# Patient Record
Sex: FEMALE | Race: WHITE | NOT HISPANIC OR LATINO | URBAN - METROPOLITAN AREA
[De-identification: names, ages, dates, MRNs, and addresses within clinical notes are randomized per-mention and may not be internally consistent; named-entity substitution may affect disease eponyms.]

---

## 2020-01-01 ENCOUNTER — HOSPITAL ENCOUNTER (INPATIENT)
Facility: HOSPITAL | Age: 85
LOS: 5 days | DRG: 177 | End: 2020-04-21
Attending: EMERGENCY MEDICINE | Admitting: FAMILY MEDICINE
Payer: MEDICARE

## 2020-01-01 VITALS
HEART RATE: 69 BPM | HEIGHT: 62 IN | SYSTOLIC BLOOD PRESSURE: 102 MMHG | DIASTOLIC BLOOD PRESSURE: 52 MMHG | WEIGHT: 118 LBS | RESPIRATION RATE: 20 BRPM | BODY MASS INDEX: 21.71 KG/M2 | OXYGEN SATURATION: 96 % | TEMPERATURE: 99 F

## 2020-01-01 DIAGNOSIS — D64.9 ANEMIA, UNSPECIFIED TYPE: ICD-10-CM

## 2020-01-01 DIAGNOSIS — F03.90 DEMENTIA WITHOUT BEHAVIORAL DISTURBANCE, UNSPECIFIED DEMENTIA TYPE: ICD-10-CM

## 2020-01-01 DIAGNOSIS — Z20.822 SUSPECTED COVID-19 VIRUS INFECTION: ICD-10-CM

## 2020-01-01 DIAGNOSIS — Z51.5 PALLIATIVE CARE ENCOUNTER: ICD-10-CM

## 2020-01-01 DIAGNOSIS — U07.1 COVID-19 VIRUS INFECTION: Primary | ICD-10-CM

## 2020-01-01 DIAGNOSIS — R06.03 ACUTE RESPIRATORY DISTRESS: ICD-10-CM

## 2020-01-01 LAB
ABO + RH BLD: NORMAL
ALBUMIN SERPL BCP-MCNC: 2.2 G/DL (ref 3.5–5.2)
ALBUMIN SERPL BCP-MCNC: 2.3 G/DL (ref 3.5–5.2)
ALBUMIN SERPL BCP-MCNC: 2.6 G/DL (ref 3.5–5.2)
ALBUMIN SERPL BCP-MCNC: 2.6 G/DL (ref 3.5–5.2)
ALP SERPL-CCNC: 108 U/L (ref 55–135)
ALP SERPL-CCNC: 109 U/L (ref 55–135)
ALP SERPL-CCNC: 112 U/L (ref 55–135)
ALP SERPL-CCNC: 97 U/L (ref 55–135)
ALT SERPL W/O P-5'-P-CCNC: 13 U/L (ref 10–44)
ALT SERPL W/O P-5'-P-CCNC: 15 U/L (ref 10–44)
ALT SERPL W/O P-5'-P-CCNC: 16 U/L (ref 10–44)
ALT SERPL W/O P-5'-P-CCNC: 16 U/L (ref 10–44)
ANION GAP SERPL CALC-SCNC: 10 MMOL/L (ref 8–16)
ANION GAP SERPL CALC-SCNC: 11 MMOL/L (ref 8–16)
AST SERPL-CCNC: 27 U/L (ref 10–40)
AST SERPL-CCNC: 29 U/L (ref 10–40)
AST SERPL-CCNC: 29 U/L (ref 10–40)
AST SERPL-CCNC: 30 U/L (ref 10–40)
BACTERIA BLD CULT: NORMAL
BACTERIA BLD CULT: NORMAL
BASOPHILS # BLD AUTO: 0.01 K/UL (ref 0–0.2)
BASOPHILS NFR BLD: 0.1 % (ref 0–1.9)
BILIRUB SERPL-MCNC: 0.4 MG/DL (ref 0.1–1)
BILIRUB SERPL-MCNC: 0.4 MG/DL (ref 0.1–1)
BILIRUB SERPL-MCNC: 0.5 MG/DL (ref 0.1–1)
BILIRUB SERPL-MCNC: 1.1 MG/DL (ref 0.1–1)
BLD GP AB SCN CELLS X3 SERPL QL: NORMAL
BLD PROD TYP BPU: NORMAL
BLOOD UNIT EXPIRATION DATE: NORMAL
BLOOD UNIT TYPE CODE: 7300
BLOOD UNIT TYPE: NORMAL
BNP SERPL-MCNC: 2211 PG/ML (ref 0–99)
BNP SERPL-MCNC: 3767 PG/ML (ref 0–99)
BUN SERPL-MCNC: 26 MG/DL (ref 10–30)
BUN SERPL-MCNC: 30 MG/DL (ref 10–30)
BUN SERPL-MCNC: 34 MG/DL (ref 10–30)
BUN SERPL-MCNC: 42 MG/DL (ref 10–30)
CALCIUM SERPL-MCNC: 7.8 MG/DL (ref 8.7–10.5)
CALCIUM SERPL-MCNC: 7.9 MG/DL (ref 8.7–10.5)
CALCIUM SERPL-MCNC: 7.9 MG/DL (ref 8.7–10.5)
CALCIUM SERPL-MCNC: 8.1 MG/DL (ref 8.7–10.5)
CHLORIDE SERPL-SCNC: 104 MMOL/L (ref 95–110)
CHLORIDE SERPL-SCNC: 106 MMOL/L (ref 95–110)
CHLORIDE SERPL-SCNC: 112 MMOL/L (ref 95–110)
CHLORIDE SERPL-SCNC: 112 MMOL/L (ref 95–110)
CK SERPL-CCNC: 32 U/L (ref 20–180)
CK SERPL-CCNC: 40 U/L (ref 20–180)
CO2 SERPL-SCNC: 20 MMOL/L (ref 23–29)
CO2 SERPL-SCNC: 21 MMOL/L (ref 23–29)
CO2 SERPL-SCNC: 24 MMOL/L (ref 23–29)
CO2 SERPL-SCNC: 24 MMOL/L (ref 23–29)
CODING SYSTEM: NORMAL
CREAT SERPL-MCNC: 0.8 MG/DL (ref 0.5–1.4)
CREAT SERPL-MCNC: 0.8 MG/DL (ref 0.5–1.4)
CREAT SERPL-MCNC: 1.1 MG/DL (ref 0.5–1.4)
CREAT SERPL-MCNC: 1.1 MG/DL (ref 0.5–1.4)
CRP SERPL-MCNC: 24.2 MG/L (ref 0–8.2)
CRP SERPL-MCNC: 27.6 MG/L (ref 0–8.2)
CRP SERPL-MCNC: 74.5 MG/L (ref 0–8.2)
CTP QC/QA: YES
DIFFERENTIAL METHOD: ABNORMAL
DISPENSE STATUS: NORMAL
EOSINOPHIL # BLD AUTO: 0 K/UL (ref 0–0.5)
EOSINOPHIL NFR BLD: 0 % (ref 0–8)
ERYTHROCYTE [DISTWIDTH] IN BLOOD BY AUTOMATED COUNT: 18.6 % (ref 11.5–14.5)
ERYTHROCYTE [DISTWIDTH] IN BLOOD BY AUTOMATED COUNT: 18.6 % (ref 11.5–14.5)
ERYTHROCYTE [DISTWIDTH] IN BLOOD BY AUTOMATED COUNT: 19 % (ref 11.5–14.5)
ERYTHROCYTE [DISTWIDTH] IN BLOOD BY AUTOMATED COUNT: 20.4 % (ref 11.5–14.5)
ERYTHROCYTE [SEDIMENTATION RATE] IN BLOOD BY WESTERGREN METHOD: 12 MM/HR (ref 0–20)
EST. GFR  (AFRICAN AMERICAN): 50 ML/MIN/1.73 M^2
EST. GFR  (AFRICAN AMERICAN): 50 ML/MIN/1.73 M^2
EST. GFR  (AFRICAN AMERICAN): >60 ML/MIN/1.73 M^2
EST. GFR  (AFRICAN AMERICAN): >60 ML/MIN/1.73 M^2
EST. GFR  (NON AFRICAN AMERICAN): 43 ML/MIN/1.73 M^2
EST. GFR  (NON AFRICAN AMERICAN): 43 ML/MIN/1.73 M^2
EST. GFR  (NON AFRICAN AMERICAN): >60 ML/MIN/1.73 M^2
EST. GFR  (NON AFRICAN AMERICAN): >60 ML/MIN/1.73 M^2
FERRITIN SERPL-MCNC: 112 NG/ML (ref 20–300)
FOLATE SERPL-MCNC: 19.5 NG/ML (ref 4–24)
GLUCOSE SERPL-MCNC: 106 MG/DL (ref 70–110)
GLUCOSE SERPL-MCNC: 119 MG/DL (ref 70–110)
GLUCOSE SERPL-MCNC: 67 MG/DL (ref 70–110)
GLUCOSE SERPL-MCNC: 98 MG/DL (ref 70–110)
HCT VFR BLD AUTO: 23.2 % (ref 37–48.5)
HCT VFR BLD AUTO: 30.7 % (ref 37–48.5)
HCT VFR BLD AUTO: 30.9 % (ref 37–48.5)
HCT VFR BLD AUTO: 35.5 % (ref 37–48.5)
HGB BLD-MCNC: 6.5 G/DL (ref 12–16)
HGB BLD-MCNC: 8.8 G/DL (ref 12–16)
HGB BLD-MCNC: 9 G/DL (ref 12–16)
HGB BLD-MCNC: 9.8 G/DL (ref 12–16)
IMM GRANULOCYTES # BLD AUTO: 0.04 K/UL (ref 0–0.04)
IMM GRANULOCYTES # BLD AUTO: 0.05 K/UL (ref 0–0.04)
IMM GRANULOCYTES # BLD AUTO: 0.09 K/UL (ref 0–0.04)
IMM GRANULOCYTES # BLD AUTO: 0.09 K/UL (ref 0–0.04)
IMM GRANULOCYTES NFR BLD AUTO: 0.5 % (ref 0–0.5)
IMM GRANULOCYTES NFR BLD AUTO: 0.6 % (ref 0–0.5)
IMM GRANULOCYTES NFR BLD AUTO: 0.6 % (ref 0–0.5)
IMM GRANULOCYTES NFR BLD AUTO: 0.8 % (ref 0–0.5)
IRON SERPL-MCNC: 76 UG/DL (ref 30–160)
LACTATE SERPL-SCNC: 1.6 MMOL/L (ref 0.5–2.2)
LACTATE SERPL-SCNC: 2.6 MMOL/L (ref 0.5–2.2)
LDH SERPL L TO P-CCNC: 288 U/L (ref 110–260)
LDH SERPL L TO P-CCNC: 395 U/L (ref 110–260)
LYMPHOCYTES # BLD AUTO: 0.6 K/UL (ref 1–4.8)
LYMPHOCYTES # BLD AUTO: 0.6 K/UL (ref 1–4.8)
LYMPHOCYTES # BLD AUTO: 0.7 K/UL (ref 1–4.8)
LYMPHOCYTES # BLD AUTO: 0.7 K/UL (ref 1–4.8)
LYMPHOCYTES NFR BLD: 5.2 % (ref 18–48)
LYMPHOCYTES NFR BLD: 5.6 % (ref 18–48)
LYMPHOCYTES NFR BLD: 6.6 % (ref 18–48)
LYMPHOCYTES NFR BLD: 9.8 % (ref 18–48)
MAGNESIUM SERPL-MCNC: 2.3 MG/DL (ref 1.6–2.6)
MAGNESIUM SERPL-MCNC: 2.5 MG/DL (ref 1.6–2.6)
MCH RBC QN AUTO: 26 PG (ref 27–31)
MCH RBC QN AUTO: 26.1 PG (ref 27–31)
MCH RBC QN AUTO: 26.4 PG (ref 27–31)
MCH RBC QN AUTO: 26.9 PG (ref 27–31)
MCHC RBC AUTO-ENTMCNC: 27.6 G/DL (ref 32–36)
MCHC RBC AUTO-ENTMCNC: 28 G/DL (ref 32–36)
MCHC RBC AUTO-ENTMCNC: 28.7 G/DL (ref 32–36)
MCHC RBC AUTO-ENTMCNC: 29.1 G/DL (ref 32–36)
MCV RBC AUTO: 91 FL (ref 82–98)
MCV RBC AUTO: 91 FL (ref 82–98)
MCV RBC AUTO: 93 FL (ref 82–98)
MCV RBC AUTO: 98 FL (ref 82–98)
MONOCYTES # BLD AUTO: 0.6 K/UL (ref 0.3–1)
MONOCYTES # BLD AUTO: 0.7 K/UL (ref 0.3–1)
MONOCYTES # BLD AUTO: 0.8 K/UL (ref 0.3–1)
MONOCYTES # BLD AUTO: 1 K/UL (ref 0.3–1)
MONOCYTES NFR BLD: 7.2 % (ref 4–15)
MONOCYTES NFR BLD: 7.6 % (ref 4–15)
MONOCYTES NFR BLD: 7.6 % (ref 4–15)
MONOCYTES NFR BLD: 9.2 % (ref 4–15)
NEUTROPHILS # BLD AUTO: 12.1 K/UL (ref 1.8–7.7)
NEUTROPHILS # BLD AUTO: 5.5 K/UL (ref 1.8–7.7)
NEUTROPHILS # BLD AUTO: 7.9 K/UL (ref 1.8–7.7)
NEUTROPHILS # BLD AUTO: 9.6 K/UL (ref 1.8–7.7)
NEUTROPHILS NFR BLD: 80.3 % (ref 38–73)
NEUTROPHILS NFR BLD: 85.2 % (ref 38–73)
NEUTROPHILS NFR BLD: 85.9 % (ref 38–73)
NEUTROPHILS NFR BLD: 86.9 % (ref 38–73)
NRBC BLD-RTO: 0 /100 WBC
NRBC BLD-RTO: 1 /100 WBC
PHOSPHATE SERPL-MCNC: 2.8 MG/DL (ref 2.7–4.5)
PHOSPHATE SERPL-MCNC: 4.1 MG/DL (ref 2.7–4.5)
PLATELET # BLD AUTO: 398 K/UL (ref 150–350)
PLATELET # BLD AUTO: 401 K/UL (ref 150–350)
PLATELET # BLD AUTO: 420 K/UL (ref 150–350)
PLATELET # BLD AUTO: 509 K/UL (ref 150–350)
PMV BLD AUTO: 9.3 FL (ref 9.2–12.9)
PMV BLD AUTO: 9.3 FL (ref 9.2–12.9)
PMV BLD AUTO: 9.4 FL (ref 9.2–12.9)
PMV BLD AUTO: 9.5 FL (ref 9.2–12.9)
POC MOLECULAR INFLUENZA A AGN: NEGATIVE
POC MOLECULAR INFLUENZA B AGN: NEGATIVE
POCT GLUCOSE: 93 MG/DL (ref 70–110)
POTASSIUM SERPL-SCNC: 4.5 MMOL/L (ref 3.5–5.1)
POTASSIUM SERPL-SCNC: 4.9 MMOL/L (ref 3.5–5.1)
POTASSIUM SERPL-SCNC: 4.9 MMOL/L (ref 3.5–5.1)
POTASSIUM SERPL-SCNC: 5 MMOL/L (ref 3.5–5.1)
PROCALCITONIN SERPL IA-MCNC: 0.05 NG/ML
PROT SERPL-MCNC: 5.4 G/DL (ref 6–8.4)
PROT SERPL-MCNC: 5.6 G/DL (ref 6–8.4)
PROT SERPL-MCNC: 5.7 G/DL (ref 6–8.4)
PROT SERPL-MCNC: 5.8 G/DL (ref 6–8.4)
RBC # BLD AUTO: 2.49 M/UL (ref 4–5.4)
RBC # BLD AUTO: 3.39 M/UL (ref 4–5.4)
RBC # BLD AUTO: 3.41 M/UL (ref 4–5.4)
RBC # BLD AUTO: 3.64 M/UL (ref 4–5.4)
SARS-COV-2 RDRP RESP QL NAA+PROBE: POSITIVE
SATURATED IRON: 23 % (ref 20–50)
SODIUM SERPL-SCNC: 138 MMOL/L (ref 136–145)
SODIUM SERPL-SCNC: 140 MMOL/L (ref 136–145)
SODIUM SERPL-SCNC: 143 MMOL/L (ref 136–145)
SODIUM SERPL-SCNC: 143 MMOL/L (ref 136–145)
TOTAL IRON BINDING CAPACITY: 332 UG/DL (ref 250–450)
TRANS ERYTHROCYTES VOL PATIENT: NORMAL ML
TRANSFERRIN SERPL-MCNC: 224 MG/DL (ref 200–375)
TROPONIN I SERPL DL<=0.01 NG/ML-MCNC: 0.12 NG/ML (ref 0–0.03)
TROPONIN I SERPL DL<=0.01 NG/ML-MCNC: 0.13 NG/ML (ref 0–0.03)
VIT B12 SERPL-MCNC: 401 PG/ML (ref 210–950)
WBC # BLD AUTO: 11.12 K/UL (ref 3.9–12.7)
WBC # BLD AUTO: 13.87 K/UL (ref 3.9–12.7)
WBC # BLD AUTO: 6.83 K/UL (ref 3.9–12.7)
WBC # BLD AUTO: 9.31 K/UL (ref 3.9–12.7)

## 2020-01-01 PROCEDURE — 99231 SBSQ HOSP IP/OBS SF/LOW 25: CPT | Mod: ,,, | Performed by: NURSE PRACTITIONER

## 2020-01-01 PROCEDURE — 86140 C-REACTIVE PROTEIN: CPT | Mod: 91

## 2020-01-01 PROCEDURE — 86850 RBC ANTIBODY SCREEN: CPT

## 2020-01-01 PROCEDURE — 84484 ASSAY OF TROPONIN QUANT: CPT

## 2020-01-01 PROCEDURE — 25000003 PHARM REV CODE 250: Performed by: INTERNAL MEDICINE

## 2020-01-01 PROCEDURE — 94761 N-INVAS EAR/PLS OXIMETRY MLT: CPT

## 2020-01-01 PROCEDURE — 85652 RBC SED RATE AUTOMATED: CPT

## 2020-01-01 PROCEDURE — 27100171 HC OXYGEN HIGH FLOW UP TO 24 HOURS

## 2020-01-01 PROCEDURE — 83605 ASSAY OF LACTIC ACID: CPT

## 2020-01-01 PROCEDURE — 63600175 PHARM REV CODE 636 W HCPCS: Performed by: INTERNAL MEDICINE

## 2020-01-01 PROCEDURE — 80053 COMPREHEN METABOLIC PANEL: CPT

## 2020-01-01 PROCEDURE — 85025 COMPLETE CBC W/AUTO DIFF WBC: CPT

## 2020-01-01 PROCEDURE — U0002 COVID-19 LAB TEST NON-CDC: HCPCS

## 2020-01-01 PROCEDURE — 86920 COMPATIBILITY TEST SPIN: CPT

## 2020-01-01 PROCEDURE — 83880 ASSAY OF NATRIURETIC PEPTIDE: CPT

## 2020-01-01 PROCEDURE — 25000003 PHARM REV CODE 250: Performed by: FAMILY MEDICINE

## 2020-01-01 PROCEDURE — P9021 RED BLOOD CELLS UNIT: HCPCS

## 2020-01-01 PROCEDURE — 82550 ASSAY OF CK (CPK): CPT

## 2020-01-01 PROCEDURE — 11000001 HC ACUTE MED/SURG PRIVATE ROOM

## 2020-01-01 PROCEDURE — 36415 COLL VENOUS BLD VENIPUNCTURE: CPT

## 2020-01-01 PROCEDURE — 99223 1ST HOSP IP/OBS HIGH 75: CPT | Mod: ,,, | Performed by: NURSE PRACTITIONER

## 2020-01-01 PROCEDURE — 82728 ASSAY OF FERRITIN: CPT

## 2020-01-01 PROCEDURE — 83735 ASSAY OF MAGNESIUM: CPT

## 2020-01-01 PROCEDURE — 99223 PR INITIAL HOSPITAL CARE,LEVL III: ICD-10-PCS | Mod: ,,, | Performed by: NURSE PRACTITIONER

## 2020-01-01 PROCEDURE — 82607 VITAMIN B-12: CPT

## 2020-01-01 PROCEDURE — 63700000 PHARM REV CODE 250 ALT 637 W/O HCPCS: Performed by: INTERNAL MEDICINE

## 2020-01-01 PROCEDURE — 83540 ASSAY OF IRON: CPT

## 2020-01-01 PROCEDURE — 99291 CRITICAL CARE FIRST HOUR: CPT | Mod: 25

## 2020-01-01 PROCEDURE — 83615 LACTATE (LD) (LDH) ENZYME: CPT | Mod: 91

## 2020-01-01 PROCEDURE — 99231 PR SUBSEQUENT HOSPITAL CARE,LEVL I: ICD-10-PCS | Mod: ,,, | Performed by: NURSE PRACTITIONER

## 2020-01-01 PROCEDURE — 84100 ASSAY OF PHOSPHORUS: CPT

## 2020-01-01 PROCEDURE — 87502 INFLUENZA DNA AMP PROBE: CPT

## 2020-01-01 PROCEDURE — 80053 COMPREHEN METABOLIC PANEL: CPT | Mod: 91

## 2020-01-01 PROCEDURE — 36430 TRANSFUSION BLD/BLD COMPNT: CPT

## 2020-01-01 PROCEDURE — 82746 ASSAY OF FOLIC ACID SERUM: CPT

## 2020-01-01 PROCEDURE — 87040 BLOOD CULTURE FOR BACTERIA: CPT

## 2020-01-01 PROCEDURE — 86140 C-REACTIVE PROTEIN: CPT

## 2020-01-01 PROCEDURE — 84145 PROCALCITONIN (PCT): CPT

## 2020-01-01 RX ORDER — CLONAZEPAM 0.25 MG/1
0.25 TABLET, ORALLY DISINTEGRATING ORAL 2 TIMES DAILY
Status: DISCONTINUED | OUTPATIENT
Start: 2020-01-01 | End: 2020-01-01

## 2020-01-01 RX ORDER — HYDROCODONE BITARTRATE AND ACETAMINOPHEN 500; 5 MG/1; MG/1
TABLET ORAL
Status: DISCONTINUED | OUTPATIENT
Start: 2020-01-01 | End: 2020-01-01 | Stop reason: HOSPADM

## 2020-01-01 RX ORDER — ATORVASTATIN CALCIUM 40 MG/1
40 TABLET, FILM COATED ORAL DAILY
Status: DISCONTINUED | OUTPATIENT
Start: 2020-01-01 | End: 2020-01-01 | Stop reason: HOSPADM

## 2020-01-01 RX ORDER — CETIRIZINE HYDROCHLORIDE 10 MG/1
10 TABLET ORAL DAILY
COMMUNITY
End: 2020-04-28 | Stop reason: HOSPADM

## 2020-01-01 RX ORDER — DONEPEZIL HYDROCHLORIDE 10 MG/1
10 TABLET, FILM COATED ORAL NIGHTLY
COMMUNITY
End: 2020-04-28 | Stop reason: HOSPADM

## 2020-01-01 RX ORDER — AZITHROMYCIN 250 MG/1
250 TABLET, FILM COATED ORAL DAILY
Status: DISCONTINUED | OUTPATIENT
Start: 2020-01-01 | End: 2020-01-01 | Stop reason: HOSPADM

## 2020-01-01 RX ORDER — NAPROXEN SODIUM 220 MG/1
81 TABLET, FILM COATED ORAL 2 TIMES DAILY
COMMUNITY
End: 2020-04-28 | Stop reason: HOSPADM

## 2020-01-01 RX ORDER — DICLOFENAC SODIUM 10 MG/G
2 GEL TOPICAL DAILY
COMMUNITY
End: 2020-04-28 | Stop reason: HOSPADM

## 2020-01-01 RX ORDER — CHOLECALCIFEROL (VITAMIN D3) 125 MCG
440 CAPSULE ORAL
COMMUNITY
End: 2020-04-28 | Stop reason: HOSPADM

## 2020-01-01 RX ORDER — ONDANSETRON 2 MG/ML
4 INJECTION INTRAMUSCULAR; INTRAVENOUS EVERY 6 HOURS PRN
Status: DISCONTINUED | OUTPATIENT
Start: 2020-01-01 | End: 2020-01-01 | Stop reason: HOSPADM

## 2020-01-01 RX ORDER — ALBUTEROL SULFATE 90 UG/1
2 AEROSOL, METERED RESPIRATORY (INHALATION) EVERY 6 HOURS PRN
Status: DISCONTINUED | OUTPATIENT
Start: 2020-01-01 | End: 2020-01-01 | Stop reason: HOSPADM

## 2020-01-01 RX ORDER — NAPROXEN SODIUM 220 MG/1
81 TABLET, FILM COATED ORAL 2 TIMES DAILY
Status: DISCONTINUED | OUTPATIENT
Start: 2020-01-01 | End: 2020-01-01 | Stop reason: HOSPADM

## 2020-01-01 RX ORDER — ACETAMINOPHEN 325 MG/1
650 TABLET ORAL EVERY 6 HOURS PRN
Status: DISCONTINUED | OUTPATIENT
Start: 2020-01-01 | End: 2020-01-01 | Stop reason: HOSPADM

## 2020-01-01 RX ORDER — METOPROLOL TARTRATE 1 MG/ML
5 INJECTION, SOLUTION INTRAVENOUS
Status: DISCONTINUED | OUTPATIENT
Start: 2020-01-01 | End: 2020-01-01 | Stop reason: HOSPADM

## 2020-01-01 RX ORDER — FERROUS SULFATE 324(65)MG
325 TABLET, DELAYED RELEASE (ENTERIC COATED) ORAL DAILY
COMMUNITY
End: 2020-04-28 | Stop reason: HOSPADM

## 2020-01-01 RX ORDER — CALCIUM CARBONATE 200(500)MG
1000 TABLET,CHEWABLE ORAL 2 TIMES DAILY PRN
Status: DISCONTINUED | OUTPATIENT
Start: 2020-01-01 | End: 2020-01-01 | Stop reason: HOSPADM

## 2020-01-01 RX ORDER — TALC
3 POWDER (GRAM) TOPICAL NIGHTLY PRN
Status: DISCONTINUED | OUTPATIENT
Start: 2020-01-01 | End: 2020-01-01 | Stop reason: HOSPADM

## 2020-01-01 RX ORDER — ENOXAPARIN SODIUM 100 MG/ML
40 INJECTION SUBCUTANEOUS EVERY 24 HOURS
Status: DISCONTINUED | OUTPATIENT
Start: 2020-01-01 | End: 2020-01-01 | Stop reason: HOSPADM

## 2020-01-01 RX ORDER — DONEPEZIL HYDROCHLORIDE 10 MG/1
10 TABLET, FILM COATED ORAL NIGHTLY
Status: DISCONTINUED | OUTPATIENT
Start: 2020-01-01 | End: 2020-01-01 | Stop reason: HOSPADM

## 2020-01-01 RX ORDER — IBUPROFEN 200 MG
16 TABLET ORAL
Status: DISCONTINUED | OUTPATIENT
Start: 2020-01-01 | End: 2020-01-01 | Stop reason: HOSPADM

## 2020-01-01 RX ORDER — PANTOPRAZOLE SODIUM 40 MG/1
40 TABLET, DELAYED RELEASE ORAL DAILY
Status: DISCONTINUED | OUTPATIENT
Start: 2020-01-01 | End: 2020-01-01 | Stop reason: HOSPADM

## 2020-01-01 RX ORDER — FUROSEMIDE 10 MG/ML
20 INJECTION INTRAMUSCULAR; INTRAVENOUS
Status: DISCONTINUED | OUTPATIENT
Start: 2020-01-01 | End: 2020-01-01

## 2020-01-01 RX ORDER — MORPHINE SULFATE 10 MG/ML
0.5 INJECTION INTRAMUSCULAR; INTRAVENOUS; SUBCUTANEOUS EVERY 4 HOURS PRN
Status: DISCONTINUED | OUTPATIENT
Start: 2020-01-01 | End: 2020-01-01 | Stop reason: HOSPADM

## 2020-01-01 RX ORDER — SODIUM CHLORIDE 0.9 % (FLUSH) 0.9 %
10 SYRINGE (ML) INJECTION
Status: DISCONTINUED | OUTPATIENT
Start: 2020-01-01 | End: 2020-01-01 | Stop reason: HOSPADM

## 2020-01-01 RX ORDER — IBUPROFEN 200 MG
24 TABLET ORAL
Status: DISCONTINUED | OUTPATIENT
Start: 2020-01-01 | End: 2020-01-01 | Stop reason: HOSPADM

## 2020-01-01 RX ORDER — CHOLECALCIFEROL (VITAMIN D3) 125 MCG
CAPSULE ORAL
COMMUNITY
End: 2020-04-28 | Stop reason: HOSPADM

## 2020-01-01 RX ORDER — MEMANTINE HYDROCHLORIDE 10 MG/1
5 TABLET ORAL 2 TIMES DAILY
COMMUNITY
End: 2020-04-28 | Stop reason: HOSPADM

## 2020-01-01 RX ORDER — MEMANTINE HYDROCHLORIDE 5 MG/1
5 TABLET ORAL 2 TIMES DAILY
Status: DISCONTINUED | OUTPATIENT
Start: 2020-01-01 | End: 2020-01-01 | Stop reason: HOSPADM

## 2020-01-01 RX ORDER — ATORVASTATIN CALCIUM 40 MG/1
40 TABLET, FILM COATED ORAL DAILY
COMMUNITY
End: 2020-04-28 | Stop reason: HOSPADM

## 2020-01-01 RX ORDER — GLUCAGON 1 MG
1 KIT INJECTION
Status: DISCONTINUED | OUTPATIENT
Start: 2020-01-01 | End: 2020-01-01 | Stop reason: HOSPADM

## 2020-01-01 RX ORDER — HYDRALAZINE HYDROCHLORIDE 20 MG/ML
10 INJECTION INTRAMUSCULAR; INTRAVENOUS EVERY 6 HOURS PRN
Status: DISCONTINUED | OUTPATIENT
Start: 2020-01-01 | End: 2020-01-01 | Stop reason: HOSPADM

## 2020-01-01 RX ORDER — CLONAZEPAM 0.25 MG/1
0.25 TABLET, ORALLY DISINTEGRATING ORAL ONCE
Status: COMPLETED | OUTPATIENT
Start: 2020-01-01 | End: 2020-01-01

## 2020-01-01 RX ORDER — GUAIFENESIN 600 MG/1
600 TABLET, EXTENDED RELEASE ORAL 2 TIMES DAILY
Status: DISCONTINUED | OUTPATIENT
Start: 2020-01-01 | End: 2020-01-01

## 2020-01-01 RX ORDER — FUROSEMIDE 10 MG/ML
20 INJECTION INTRAMUSCULAR; INTRAVENOUS
Status: ACTIVE | OUTPATIENT
Start: 2020-01-01 | End: 2020-01-01

## 2020-01-01 RX ORDER — CLOPIDOGREL BISULFATE 75 MG/1
75 TABLET ORAL DAILY
COMMUNITY
End: 2020-04-28 | Stop reason: HOSPADM

## 2020-01-01 RX ORDER — CLONAZEPAM 0.25 MG/1
0.25 TABLET, ORALLY DISINTEGRATING ORAL 2 TIMES DAILY
Status: DISCONTINUED | OUTPATIENT
Start: 2020-01-01 | End: 2020-01-01 | Stop reason: HOSPADM

## 2020-01-01 RX ADMIN — GUAIFENESIN 600 MG: 600 TABLET, EXTENDED RELEASE ORAL at 08:04

## 2020-01-01 RX ADMIN — Medication 3 MG: at 10:04

## 2020-01-01 RX ADMIN — SODIUM CHLORIDE 500 ML: 0.9 INJECTION, SOLUTION INTRAVENOUS at 02:04

## 2020-01-01 RX ADMIN — ENOXAPARIN SODIUM 40 MG: 100 INJECTION SUBCUTANEOUS at 08:04

## 2020-01-01 RX ADMIN — MEMANTINE HYDROCHLORIDE 5 MG: 5 TABLET ORAL at 08:04

## 2020-01-01 RX ADMIN — CLONAZEPAM 0.25 MG: 0.25 TABLET, ORALLY DISINTEGRATING ORAL at 08:04

## 2020-01-01 RX ADMIN — ASPIRIN 81 MG 81 MG: 81 TABLET ORAL at 08:04

## 2020-01-01 RX ADMIN — CEFTRIAXONE 1 G: 1 INJECTION, SOLUTION INTRAVENOUS at 11:04

## 2020-01-01 RX ADMIN — PANTOPRAZOLE SODIUM 40 MG: 40 TABLET, DELAYED RELEASE ORAL at 11:04

## 2020-01-01 RX ADMIN — AZITHROMYCIN MONOHYDRATE 250 MG: 250 TABLET ORAL at 11:04

## 2020-01-01 RX ADMIN — ATORVASTATIN CALCIUM 40 MG: 40 TABLET, FILM COATED ORAL at 11:04

## 2020-01-01 RX ADMIN — GUAIFENESIN 600 MG: 600 TABLET, EXTENDED RELEASE ORAL at 09:04

## 2020-01-01 RX ADMIN — MORPHINE SULFATE 0.5 MG: 10 INJECTION INTRAMUSCULAR; INTRAVENOUS; SUBCUTANEOUS at 09:04

## 2020-01-01 RX ADMIN — MEMANTINE HYDROCHLORIDE 5 MG: 5 TABLET ORAL at 11:04

## 2020-01-01 RX ADMIN — CLONAZEPAM 0.25 MG: 0.25 TABLET, ORALLY DISINTEGRATING ORAL at 11:04

## 2020-01-01 RX ADMIN — DONEPEZIL HYDROCHLORIDE 10 MG: 10 TABLET, FILM COATED ORAL at 08:04

## 2020-01-01 RX ADMIN — ASPIRIN 81 MG 81 MG: 81 TABLET ORAL at 11:04

## 2020-04-16 PROBLEM — F03.90 DEMENTIA: Status: ACTIVE | Noted: 2020-01-01

## 2020-04-16 PROBLEM — D64.9 ACUTE ANEMIA: Status: ACTIVE | Noted: 2020-01-01

## 2020-04-16 PROBLEM — U07.1 COVID-19 VIRUS INFECTION: Status: ACTIVE | Noted: 2020-01-01

## 2020-04-16 NOTE — ED NOTES
Pt still on NRB--NAD--monitoring closely--daughter to fax POA in order to receive information on her mother

## 2020-04-16 NOTE — ED TRIAGE NOTES
Pt to er from nursing home due to increased SOB today--denies any pain--EMS states that pt was 83% on RA--NRB in place--no fever reported

## 2020-04-16 NOTE — SUBJECTIVE & OBJECTIVE
Past Medical History:   Diagnosis Date    Anticoagulant long-term use     Anxiety disorder, unspecified     Coronary atherosclerosis due to lipid rich plaque     Coronary atherosclerosis due to lipid rich plaque 04/16/2020    Dementia     Hyperlipidemia, unspecified     Nutritional deficiency, unspecified     Other malaise     Seasonal allergic rhinitis     Unspecified corneal ulcer, left eye     Weakness        History reviewed. No pertinent surgical history.    Review of patient's allergies indicates:  Allergies not on file    Medications:  Continuous Infusions:  Scheduled Meds:  PRN Meds:    Family History     None        Tobacco Use    Smoking status: Unknown If Ever Smoked   Substance and Sexual Activity    Alcohol use: Never     Frequency: Never    Drug use: Not on file    Sexual activity: Not on file       Review of Systems   Constitutional: Positive for activity change and fatigue.   Respiratory: Positive for shortness of breath.    Musculoskeletal: Positive for myalgias.     Objective:     Vital Signs (Most Recent):  Temp: 99 °F (37.2 °C) (04/16/20 1316)  Pulse: 96 (04/16/20 1502)  Resp: (!) 29 (04/16/20 1502)  BP: (!) 146/79 (04/16/20 1502)  SpO2: 100 % (04/16/20 1502) Vital Signs (24h Range):  Temp:  [99 °F (37.2 °C)] 99 °F (37.2 °C)  Pulse:  [87-96] 96  Resp:  [23-29] 29  SpO2:  [97 %-100 %] 100 %  BP: (144-193)/(74-81) 146/79     Weight: 54.4 kg (120 lb)  Body mass index is 21.26 kg/m².    Review of Symptoms  Symptom Assessment (ESAS 0-10 scale)   ESAS 0 1 2 3 4 5 6 7 8 9 10   Pain              Dyspnea              Anxiety              Nausea              Depression               Anorexia              Fatigue              Insomnia              Restlessness               Agitation                Physical Exam   Constitutional:   Small, petite   Pulmonary/Chest:   10 Liters oxygen   Abdominal: Soft. Bowel sounds are normal.   Neurological: She is alert.   Oriented x 2   Skin: Skin is  warm and dry.   Nursing note and vitals reviewed.      Significant Labs: All pertinent labs within the past 24 hours have been reviewed.  CBC:   Recent Labs   Lab 04/16/20  1347   WBC 6.83   HGB 6.5*   HCT 23.2*   MCV 93   *     BMP:  Recent Labs   Lab 04/16/20  1347   GLU 98      K 4.9      CO2 24   BUN 26   CREATININE 1.1   CALCIUM 7.9*     LFT:  Lab Results   Component Value Date    AST 29 04/16/2020    ALKPHOS 109 04/16/2020    BILITOT 0.4 04/16/2020     Albumin:   Albumin   Date Value Ref Range Status   04/16/2020 2.6 (L) 3.5 - 5.2 g/dL Final     Protein:   Total Protein   Date Value Ref Range Status   04/16/2020 5.6 (L) 6.0 - 8.4 g/dL Final     Lactic acid:   Lab Results   Component Value Date    LACTATE 2.6 (H) 04/16/2020       Significant Imaging: I have reviewed all pertinent imaging results/findings within the past 24 hours.    Advance Care Planning   Advanced Directives::  Living Will: Yes. Daughter faxed over copy  LaPOST: Yes  Do Not Resuscitate Status:Patient is now a DNR  Medical Power of : Yes. Daughter Christy Moya [phone] Copy faxed from [facility]      Decision-Making Capacity: Family answered questions. Patient has Dementia so limited     Living Arrangements: Lives in nursing home      ASSESSMENT/PLAN:    Palliative encounter:    Patient in ED and alert and oriented x 2. She knows who president is but does not know where she is and thinks she is at nursing home. She asks can I take her back to her room. She is on 10 Liters oxygen but in no distress at present.    Patient states she feels sore all over. Unable to use rating scale.Noted dx of opioid dependence and chronic pain syndrome, and  shows monthly prescriptions for Clonazepam and Percocet for last 2 years but not on nursing home med rec. Called nursing home and they report both meds discontinued this week.     Called daughter Christy Moya who is HPOA. She has faxed over her mother's Living Will and HPOA.  Patient also came in with a LaPOST from nursing home with Full code on it. We discussed her mother's current clinical condition and COVID +, along with her age and underlying co morbids that patient may not have a good outcome. We discussed CPR and intubation in detail and the daughter does not want either. She does recall LaPOST with Full code document she signed last year but states she would not want this now under these circumstances should her mother decline and or have a poor outcome. I let her know a DNR/DNI will be recorded and a new LaPOST with DNR will be completed at discharge.  Patient nurse in ED Paulette Alexis also witness to daughter's decision for DNR/DNI.    Also received consent from daughter for transfusion due to HH.   Answered all questions to her satisfaction. Emotional support provided.  Updated Dr Kelly    -continue current  Treatment  -patient is now a DNR/DNI  -new LaPOST/DNR will be done at discharge and old voided  -Palliative to continue to follow

## 2020-04-16 NOTE — SUBJECTIVE & OBJECTIVE
Past Medical History:   Diagnosis Date    Anticoagulant long-term use     Anxiety disorder, unspecified     Carotid artery occlusion     Coronary atherosclerosis due to lipid rich plaque     Coronary atherosclerosis due to lipid rich plaque 04/16/2020    Dementia     Hyperlipidemia, unspecified     Nursing home resident     Nutritional deficiency, unspecified     Other malaise     Seasonal allergic rhinitis     Status post placement of cardiac pacemaker     Symptomatic anemia 04/16/2020    Unspecified corneal ulcer, left eye     Weakness        Past Surgical History:   Procedure Laterality Date    CARDIAC PACEMAKER PLACEMENT Left     CARDIAC SURGERY      CABG    EYE SURGERY      cataract    FINGER AMPUTATION Right     1/2 of 4th/ring finger    HYSTERECTOMY         Review of patient's allergies indicates:  No Known Allergies    No current facility-administered medications on file prior to encounter.      Current Outpatient Medications on File Prior to Encounter   Medication Sig    aspirin 81 MG Chew Take 81 mg by mouth 2 (two) times daily.    atorvastatin (LIPITOR) 40 MG tablet Take 40 mg by mouth once daily.    cetirizine (ZYRTEC) 10 MG tablet Take 10 mg by mouth once daily.    clopidogreL (PLAVIX) 75 mg tablet Take 75 mg by mouth once daily.    diclofenac sodium (VOLTAREN) 1 % Gel Apply 2 g topically once daily.    donepeziL (ARICEPT) 10 MG tablet Take 10 mg by mouth every evening.    ferrous sulfate 324 mg (65 mg iron) TbEC Take 325 mg by mouth once daily.    Lactobacillus acidophilus (ACIDOPHILUS ORAL) Take by mouth.    memantine (NAMENDA) 10 MG Tab Take 5 mg by mouth 2 (two) times daily.    multivitamin capsule Take 1 capsule by mouth once daily.    naproxen sodium (ALEVE) 220 mg Cap Take by mouth.    naproxen sodium (ALEVE) 220 mg Cap Take 440 mg by mouth.     Family History     None        Tobacco Use    Smoking status: Never Smoker    Smokeless tobacco: Never Used   Substance  and Sexual Activity    Alcohol use: Never     Frequency: Never    Drug use: Not on file    Sexual activity: Not Currently     Review of Systems   Unable to perform ROS: Dementia     Objective:     Vital Signs (Most Recent):  Temp: 98.5 °F (36.9 °C) (04/16/20 1736)  Pulse: 96 (04/16/20 1716)  Resp: (!) 26 (04/16/20 1716)  BP: (!) 146/69 (04/16/20 1716)  SpO2: 100 % (04/16/20 1716) Vital Signs (24h Range):  Temp:  [98.5 °F (36.9 °C)-99 °F (37.2 °C)] 98.5 °F (36.9 °C)  Pulse:  [85-96] 96  Resp:  [20-29] 26  SpO2:  [97 %-100 %] 100 %  BP: (144-193)/(69-81) 146/69     Weight: 54.4 kg (120 lb)  Body mass index is 21.26 kg/m².    Physical Exam   Constitutional: She appears distressed.   HENT:   Head: Normocephalic and atraumatic.   Eyes: Pupils are equal, round, and reactive to light. EOM are normal. No scleral icterus.   Neck: Normal range of motion. No tracheal deviation present. No thyromegaly present.   Cardiovascular: Normal rate and regular rhythm.   Pulmonary/Chest: She has rales.   Coarse breath sounds.  Inc work of breathing   Abdominal: Soft. Bowel sounds are normal.   Musculoskeletal: Normal range of motion. She exhibits no edema or deformity.   Neurological: She is alert. She exhibits normal muscle tone.   Skin: Skin is dry. Capillary refill takes 2 to 3 seconds. She is not diaphoretic. There is pallor.   Psychiatric: She has a normal mood and affect. Her behavior is normal.   Vitals reviewed.

## 2020-04-16 NOTE — ED NOTES
Verbal consent from daughter received via telephone with 2 witnesses: Nisa Rodriguez NP and Paulette Alexis RN

## 2020-04-16 NOTE — ASSESSMENT & PLAN NOTE
- COVID-19 testing   - Infection Control notified     - Isolation:   - Airborne, Contact and Droplet Precautions  - Cohort patients into COVID units  - N95 masks must be fit tested, wear eye protection  - 20 second hand hygiene              - Limit visitors per hospital policy              - Consolidating lab draws, nursing care, provider visits, and interventions    - Diagnostics: (leukopenia, hyponatremia, hyperferritinemia, elevated troponin, elevated d-dimer, age, and comorbidities are significant predictors of poor clinical outcome)  CBC, CMP, Procalcitonin, Ferritin, CRP, BNP, Troponin and Portable CXR    - Management:  Supplemental O2 to maintain SpO2 >92%  Continuous/intermittent Pulse Ox  Albuterol treatment PRN  Avoiding BiPAP to prevent aerosolization (including home BiPAP)     Patient made DNR/DNI in the ER.  Palliative care evaluated patient and had discussion with family.  Patient is a resident of a nursing home.  If declines, then would be hospice appropriate.

## 2020-04-16 NOTE — H&P
[facility] Hospital Medicine  History & Physical    Patient Name: Peri Aldridge  MRN: [id number]  Admission Date: 4/16/2020  Attending Physician: Harleen Kelly MD   Primary Care Provider: Jamir Myers MD         Patient information was obtained from ER records.     Subjective:     Principal Problem:<principal problem not specified>    Chief Complaint:   Chief Complaint   Patient presents with    Cough     Per EMS, [facility] staff reports cough x3 days. EMS reports bilateral wheezes. Room air sat=85%        HPI: Pt is a pleasant 89 year old female with a h/o dementia, and resident of a nursing home who presents to the ER for dyspnea and hypoxia.  On arrival patient was placed on a non-rebreather.  In the ER her COVID test was positive for the coronovirus infection and her labwork pointed towards that as well. Her XRAY showed BL infiltrates vs pulmonary edema.  Of note patient was also found to be anemic ~6 and there no previous lab values to compare it to.  Stool occult was negative per the ED physician.  1 unit PRBC was ordered with 20 mg of IV lasix.  Palliative care was also consulted and after discussion with family patient was made DNR/DNI in the ED.  On interview pt is alert but disoriented and unable to contribute to history.  She askes ' can I go to my room now? '.          Past Medical History:   Diagnosis Date    Anticoagulant long-term use     Anxiety disorder, unspecified     Carotid artery occlusion     Coronary atherosclerosis due to lipid rich plaque     Coronary atherosclerosis due to lipid rich plaque 04/16/2020    Dementia     Hyperlipidemia, unspecified     Nursing home resident     Nutritional deficiency, unspecified     Other malaise     Seasonal allergic rhinitis     Status post placement of cardiac pacemaker     Symptomatic anemia 04/16/2020    Unspecified corneal ulcer, left eye     Weakness        Past Surgical History:   Procedure Laterality  Date    CARDIAC PACEMAKER PLACEMENT Left     CARDIAC SURGERY      CABG    EYE SURGERY      cataract    FINGER AMPUTATION Right     1/2 of 4th/ring finger    HYSTERECTOMY         Review of patient's allergies indicates:  No Known Allergies    No current facility-administered medications on file prior to encounter.      Current Outpatient Medications on File Prior to Encounter   Medication Sig    aspirin 81 MG Chew Take 81 mg by mouth 2 (two) times daily.    atorvastatin (LIPITOR) 40 MG tablet Take 40 mg by mouth once daily.    cetirizine (ZYRTEC) 10 MG tablet Take 10 mg by mouth once daily.    clopidogreL (PLAVIX) 75 mg tablet Take 75 mg by mouth once daily.    diclofenac sodium (VOLTAREN) 1 % Gel Apply 2 g topically once daily.    donepeziL (ARICEPT) 10 MG tablet Take 10 mg by mouth every evening.    ferrous sulfate 324 mg (65 mg iron) TbEC Take 325 mg by mouth once daily.    Lactobacillus acidophilus (ACIDOPHILUS ORAL) Take by mouth.    memantine (NAMENDA) 10 MG Tab Take 5 mg by mouth 2 (two) times daily.    multivitamin capsule Take 1 capsule by mouth once daily.    naproxen sodium (ALEVE) 220 mg Cap Take by mouth.    naproxen sodium (ALEVE) 220 mg Cap Take 440 mg by mouth.     Family History     None        Tobacco Use    Smoking status: Never Smoker    Smokeless tobacco: Never Used   Substance and Sexual Activity    Alcohol use: Never     Frequency: Never    Drug use: Not on file    Sexual activity: Not Currently     Review of Systems   Unable to perform ROS: Dementia     Objective:     Vital Signs (Most Recent):  Temp: 98.5 °F (36.9 °C) (04/16/20 1736)  Pulse: 96 (04/16/20 1716)  Resp: (!) 26 (04/16/20 1716)  BP: (!) 146/69 (04/16/20 1716)  SpO2: 100 % (04/16/20 1716) Vital Signs (24h Range):  Temp:  [98.5 °F (36.9 °C)-99 °F (37.2 °C)] 98.5 °F (36.9 °C)  Pulse:  [85-96] 96  Resp:  [20-29] 26  SpO2:  [97 %-100 %] 100 %  BP: (144-193)/(69-81) 146/69     Weight: 54.4 kg (120 lb)  Body mass  index is 21.26 kg/m².    Physical Exam   Constitutional: She appears distressed.   HENT:   Head: Normocephalic and atraumatic.   Eyes: Pupils are equal, round, and reactive to light. EOM are normal. No scleral icterus.   Neck: Normal range of motion. No tracheal deviation present. No thyromegaly present.   Cardiovascular: Normal rate and regular rhythm.   Pulmonary/Chest: She has rales.   Coarse breath sounds.  Inc work of breathing   Abdominal: Soft. Bowel sounds are normal.   Musculoskeletal: Normal range of motion. She exhibits no edema or deformity.   Neurological: She is alert. She exhibits normal muscle tone.   Skin: Skin is dry. Capillary refill takes 2 to 3 seconds. She is not diaphoretic. There is pallor.   Psychiatric: She has a normal mood and affect. Her behavior is normal.   Vitals reviewed.        Assessment/Plan:     COVID-19 virus infection  - COVID-19 testing   - Infection Control notified     - Isolation:   - Airborne, Contact and Droplet Precautions  - Cohort patients into COVID units  - N95 masks must be fit tested, wear eye protection  - 20 second hand hygiene              - Limit visitors per hospital policy              - Consolidating lab draws, nursing care, provider visits, and interventions    - Diagnostics: (leukopenia, hyponatremia, hyperferritinemia, elevated troponin, elevated d-dimer, age, and comorbidities are significant predictors of poor clinical outcome)  CBC, CMP, Procalcitonin, Ferritin, CRP, BNP, Troponin and Portable CXR    - Management:  Supplemental O2 to maintain SpO2 >92%  Continuous/intermittent Pulse Ox  Albuterol treatment PRN  Avoiding BiPAP to prevent aerosolization (including home BiPAP)     Patient made DNR/DNI in the ER.  Palliative care evaluated patient and had discussion with family.  Patient is a resident of a nursing home.  If declines, then would be hospice appropriate.                 Acute anemia  Unclear etiology.  Stool occult negative per ED provider.   Will do anemia workup with iron/b12/folate.  Trend H/H.  1uPRBC and 20 mg IV lasix ordered in ED.        Dementia  Chronic.  Monitor.  Pt unable to contribute to history.        VTE Risk Mitigation (From admission, onward)         Ordered     Place sequential compression device  Until discontinued      04/16/20 1837     IP VTE HIGH RISK PATIENT  Once      04/16/20 1834     Place BRITTNEY hose  Until discontinued      04/16/20 1834                   Tayo Kingston MD  Department of Hospital Medicine   [facility]

## 2020-04-16 NOTE — ASSESSMENT & PLAN NOTE
Unclear etiology.  Stool occult negative per ED provider.  Will do anemia workup with iron/b12/folate.  Trend H/H.  1uPRBC and 20 mg IV lasix ordered in ED.

## 2020-04-16 NOTE — ED PROVIDER NOTES
Encounter Date: 4/16/2020    SCRIBE #1 NOTE: I, Erich Leigh, am scribing for, and in the presence of,  Harleen Kelly MD. I have scribed the following portions of the note - Other sections scribed: HPI, PE, MDM.       History     Chief Complaint   Patient presents with    Cough     Per EMS, [place] staff reports cough x3 days. EMS reports bilateral wheezes. Room air sat=85%     Peri Aldridge is an 89 y.o. female presenting to the ED via EMS complaining of a sudden onset of shortness of breath. Patient was on a non-rebreather on arrival to the ED. Patient has no complaints and says she wants to go home soon. Patient has a past medical history of dementia. Unable to provide complete history due to patient's dementia.      The history is provided by the EMS personnel and the patient.     Review of patient's allergies indicates:  Not on File  Past Medical History:   Diagnosis Date    Anticoagulant long-term use     Anxiety disorder, unspecified     Coronary atherosclerosis due to lipid rich plaque     Coronary atherosclerosis due to lipid rich plaque 04/16/2020    Dementia     Hyperlipidemia, unspecified     Nutritional deficiency, unspecified     Other malaise     Seasonal allergic rhinitis     Unspecified corneal ulcer, left eye     Weakness      History reviewed. No pertinent surgical history.  History reviewed. No pertinent family history.  Social History     Tobacco Use    Smoking status: Unknown If Ever Smoked   Substance Use Topics    Alcohol use: Never     Frequency: Never    Drug use: Not on file     Review of Systems   Unable to perform ROS: Dementia       Physical Exam     Initial Vitals [04/16/20 1316]   BP Pulse Resp Temp SpO2   (!) 193/81 89 (!) 24 99 °F (37.2 °C) 100 %      MAP       --         Physical Exam    Nursing note and vitals reviewed.  Constitutional: She appears well-developed and well-nourished. She is not diaphoretic. No distress.   HENT:   Head: Normocephalic and  atraumatic.   Eyes: Conjunctivae and EOM are normal. Pupils are equal, round, and reactive to light. No scleral icterus.   Neck: Normal range of motion. Neck supple.   Cardiovascular: Normal rate, regular rhythm, normal heart sounds and intact distal pulses. Exam reveals no gallop and no friction rub.    No murmur heard.  Pulmonary/Chest: Breath sounds normal. No respiratory distress. She has no wheezes. She has no rhonchi. She has no rales.   Abdominal: Soft. Bowel sounds are normal. She exhibits no distension. There is no tenderness. There is no rebound and no guarding.   Musculoskeletal: Normal range of motion. She exhibits no edema or tenderness.   Neurological: She is alert. She has normal strength. No cranial nerve deficit.   Patient is oriented to person.    Skin: Skin is warm and dry. Capillary refill takes less than 2 seconds. No rash noted.   Psychiatric:   Patient is happy, smiling, polite, interactive, and confused. Patient is able to answer simple questions.         ED Course   Critical Care  Date/Time: 4/16/2020 5:04 PM  Performed by: Harleen Kelly MD  Authorized by: Harleen Kelly MD   Direct patient critical care time: 10 minutes  Additional history critical care time: 5 minutes  Ordering / reviewing critical care time: 5 minutes  Documentation critical care time: 5 minutes  Consulting other physicians critical care time: 5 minutes  Total critical care time (exclusive of procedural time) : 30 minutes        Labs Reviewed   CBC W/ AUTO DIFFERENTIAL - Abnormal; Notable for the following components:       Result Value    RBC 2.49 (*)     Hemoglobin 6.5 (*)     Hematocrit 23.2 (*)     Mean Corpuscular Hemoglobin 26.1 (*)     Mean Corpuscular Hemoglobin Conc 28.0 (*)     RDW 20.4 (*)     Platelets 401 (*)     Immature Granulocytes 0.6 (*)     Lymph # 0.7 (*)     nRBC 1 (*)     Gran% 80.3 (*)     Lymph% 9.8 (*)     All other components within normal limits   COMPREHENSIVE METABOLIC PANEL -  Abnormal; Notable for the following components:    Calcium 7.9 (*)     Total Protein 5.6 (*)     Albumin 2.6 (*)     eGFR if  50 (*)     eGFR if non  43 (*)     All other components within normal limits   C-REACTIVE PROTEIN - Abnormal; Notable for the following components:    CRP 27.6 (*)     All other components within normal limits   LACTATE DEHYDROGENASE - Abnormal; Notable for the following components:     (*)     All other components within normal limits   LACTIC ACID, PLASMA - Abnormal; Notable for the following components:    Lactate (Lactic Acid) 2.6 (*)     All other components within normal limits   TROPONIN I - Abnormal; Notable for the following components:    Troponin I 0.118 (*)     All other components within normal limits   SARS-COV-2 RNA AMPLIFICATION, QUAL - Abnormal; Notable for the following components:    SARS-CoV-2 RNA, Amplification, Qual Positive (*)     All other components within normal limits    Narrative:     What symptom criteria does the patient meet?->Difficulty  breathing   B-TYPE NATRIURETIC PEPTIDE - Abnormal; Notable for the following components:    BNP 3,767 (*)     All other components within normal limits   FERRITIN   CK   PROCALCITONIN   POCT INFLUENZA A/B MOLECULAR   TYPE & SCREEN   PREPARE RBC SOFT   PREPARE RBC SOFT          Imaging Results          X-Ray Chest AP Portable (Final result)  Result time 04/16/20 13:51:24    Final result by Peri Mendez MD (04/16/20 13:51:24)                 Impression:      Abnormal chest radiograph with features more typical of pulmonary edema than of pneumonia, noting that COVID-19 pneumonia cannot be excluded.      Electronically signed by: Peri Mendez MD  Date:    04/16/2020  Time:    13:51             Narrative:    EXAMINATION:  XR CHEST AP PORTABLE    CLINICAL HISTORY:  Suspected Covid-19 Virus Infection;    TECHNIQUE:  Single frontal view of the chest was  "performed.    COMPARISON:  None    FINDINGS:  Study was obtained with the patient in steep left posterior oblique rotation.  The patient's chin obscures detail of the left upper lung zone.    AICD generator in the left anterior chest wall has attached leads that extend to the right atrium and right ventricle.  Sternal wires are intact and aligned.  Two ostial bypass markers project over the ascending aorta.    There are multiple right rib fractures that may be old.    Allowing for patient rotation the mediastinal structures are midline.  Dense calcific plaque noted at the level of the arch in this 89-year-old woman.    Patchy heterogeneous opacities are present in perihilar distribution, superimposed on a more widespread fine reticulonodular pattern compatible with interstitial lung disease.  Findings suggest pulmonary edema but pneumonia including COVID-19 pneumonia cannot be excluded.    Homogeneous opacity in the left mid and lower lung zones likely represents compressive atelectasis from overlying cardiac structures, with or without left pleural fluid.    I detect no convincing evidence of right pleural fluid and no pneumothorax, pneumomediastinum, pneumoperitoneum or significant osseous abnormality.                                 Medical Decision Making:   Clinical Tests:   Lab Tests: Reviewed and Ordered  Radiological Study: Reviewed and Ordered  Medical Tests: Reviewed and Ordered  ED Management:  Patient is heme-negative. Normal rectal tone. Minimal light brown stool. Chaperoned by Sola (RN)    Labs noted. Cbc, cmp, pt is covid positive.   Attempts made to wean NRB by me, pt quickly becomes moderately tachypneic and lucidly says "I'm very short of breath". sats dropped to low 90s and I placed NRB back on to relieve her sxs.   Palliative care consult placed and Nisa spoke w daughter and they decided to change pt code status to DNR.   Daughter agreed to have transfusion for hb <7, may have a component of " symptomatic anemia, cannot exclude.   bnp elevated, will give lasix, may be progression of covid.   Pt needs admission.   allx not entered on NH notes, will assume negative.   Consent obtained for blood transfusion given by daughter to palliative care.     Discussed admission with ALBINO Lopez. Will give one unit prbc and reassess.             Scribe Attestation:   Scribe #1: I performed the above scribed service and the documentation accurately describes the services I performed. I attest to the accuracy of the note.                          Clinical Impression:     1. COVID-19 virus infection    2. Suspected Covid-19 Virus Infection    3. Acute respiratory distress    4. Anemia, unspecified type    5. Dementia without behavioral disturbance, unspecified dementia type                ED Disposition Condition    Admit                  I, Harleen Kelly MD, personally performed the services described in this documentation. All medical record entries made by the scribe were at my direction and in my presence. I have reviewed the chart and agree that the record reflects my personal performance and is accurate and complete.           Harleen Kelly MD  04/16/20 9749

## 2020-04-16 NOTE — PROGRESS NOTES
Patient from [facility]. Per [facility] Face Sheet, patient has [facility] on file. First contact is patient's daughter, Christy Moya [phone].  Vanessa Duncan LMSW, [facility] [place]  4/16/20

## 2020-04-16 NOTE — CONSULTS
[facility]  Palliative Medicine  Consult Note    Patient Name: Peri Aldridge  MRN: [id number]  Admission Date: 4/16/2020  Hospital Length of Stay: 0 days  Code Status: DNR   Attending Provider: Harleen Kelly MD  Consulting Provider: Nisa Rodriguez NP  Primary Care Physician: Jamir Myers MD  Principal Problem:<principal problem not specified>    Patient information was obtained from relative(s), past medical records and ER records.      Thank you for your consult. I will follow-up with patient. Please contact us if you have any additional questions.    Subjective:     HPI:   Chief Complaint   Patient presents with    Cough       Per EMS, [place] staff reports cough x3 days. EMS reports bilateral wheezes. Room air sat=85%      Peri Aldridge is an 89 y.o. female presenting to the ED via EMS complaining of a sudden onset of shortness of breath. Patient was on a non-rebreather on arrival to the ED. Patient has no complaints and says she wants to go home soon. Patient has a past medical history of dementia. Unable to provide complete history due to patient's dementia.         Hospital Course:  No notes on file        Past Medical History:   Diagnosis Date    Anticoagulant long-term use     Anxiety disorder, unspecified     Coronary atherosclerosis due to lipid rich plaque     Coronary atherosclerosis due to lipid rich plaque 04/16/2020    Dementia     Hyperlipidemia, unspecified     Nutritional deficiency, unspecified     Other malaise     Seasonal allergic rhinitis     Unspecified corneal ulcer, left eye     Weakness        History reviewed. No pertinent surgical history.    Review of patient's allergies indicates:  Allergies not on file    Medications:  Continuous Infusions:  Scheduled Meds:  PRN Meds:    Family History     None        Tobacco Use    Smoking status: Unknown If Ever Smoked   Substance and Sexual Activity    Alcohol use: Never     Frequency: Never    Drug  use: Not on file    Sexual activity: Not on file       Review of Systems   Constitutional: Positive for activity change and fatigue.   Respiratory: Positive for shortness of breath.    Musculoskeletal: Positive for myalgias.     Objective:     Vital Signs (Most Recent):  Temp: 99 °F (37.2 °C) (04/16/20 1316)  Pulse: 96 (04/16/20 1502)  Resp: (!) 29 (04/16/20 1502)  BP: (!) 146/79 (04/16/20 1502)  SpO2: 100 % (04/16/20 1502) Vital Signs (24h Range):  Temp:  [99 °F (37.2 °C)] 99 °F (37.2 °C)  Pulse:  [87-96] 96  Resp:  [23-29] 29  SpO2:  [97 %-100 %] 100 %  BP: (144-193)/(74-81) 146/79     Weight: 54.4 kg (120 lb)  Body mass index is 21.26 kg/m².    Review of Symptoms  Symptom Assessment (ESAS 0-10 scale)   ESAS 0 1 2 3 4 5 6 7 8 9 10   Pain              Dyspnea              Anxiety              Nausea              Depression               Anorexia              Fatigue              Insomnia              Restlessness               Agitation                Physical Exam   Constitutional:   Small, petite   Pulmonary/Chest:   10 Liters oxygen   Abdominal: Soft. Bowel sounds are normal.   Neurological: She is alert.   Oriented x 2   Skin: Skin is warm and dry.   Nursing note and vitals reviewed.      Significant Labs: All pertinent labs within the past 24 hours have been reviewed.  CBC:   Recent Labs   Lab 04/16/20  1347   WBC 6.83   HGB 6.5*   HCT 23.2*   MCV 93   *     BMP:  Recent Labs   Lab 04/16/20  1347   GLU 98      K 4.9      CO2 24   BUN 26   CREATININE 1.1   CALCIUM 7.9*     LFT:  Lab Results   Component Value Date    AST 29 04/16/2020    ALKPHOS 109 04/16/2020    BILITOT 0.4 04/16/2020     Albumin:   Albumin   Date Value Ref Range Status   04/16/2020 2.6 (L) 3.5 - 5.2 g/dL Final     Protein:   Total Protein   Date Value Ref Range Status   04/16/2020 5.6 (L) 6.0 - 8.4 g/dL Final     Lactic acid:   Lab Results   Component Value Date    LACTATE 2.6 (H) 04/16/2020       Significant Imaging: I  have reviewed all pertinent imaging results/findings within the past 24 hours.    Advance Care Planning   Advanced Directives::  Living Will: Yes. Daughter faxed over copy  LaPOST: Yes  Do Not Resuscitate Status:Patient is now a DNR  Medical Power of : Yes. Daughter Christy Moya [phone] Copy faxed from [facility]      Decision-Making Capacity: Family answered questions. Patient has Dementia so limited     Living Arrangements: Lives in nursing home      ASSESSMENT/PLAN:    Palliative encounter:    Patient in ED and alert and oriented x 2. She knows who president is but does not know where she is and thinks she is at nursing home. She asks can I take her back to her room. She is on 10 Liters oxygen but in no distress at present.    Patient states she feels sore all over. Unable to use rating scale.Noted dx of opioid dependence and chronic pain syndrome, and  shows monthly prescriptions for Clonazepam and Percocet for last 2 years but not on nursing home med rec. Called nursing home and they report both meds discontinued this week.     Called daughter Christy Moya who is HPOA. She has faxed over her mother's Living Will and HPOA. Patient also came in with a LaPOST from nursing home with Full code on it. We discussed her mother's current clinical condition and COVID +, along with her age and underlying co morbids that patient may not have a good outcome. We discussed CPR and intubation in detail and the daughter does not want either. She does recall LaPOST with Full code document she signed last year but states she would not want this now under these circumstances should her mother decline and or have a poor outcome. I let her know a DNR/DNI will be recorded and a new LaPOST with DNR will be completed at discharge.  Patient nurse in ED Paulette Alexis also witness to daughter's decision for DNR/DNI.    Also received consent from daughter for transfusion due to HH.   Answered all questions to her  satisfaction. Emotional support provided.  Updated Dr Kelly    -continue current  Treatment  -patient is now a DNR/DNI  -new LaPOST/DNR will be done at discharge and old voided  -Palliative to continue to follow           > 50% of 70 min visit spent in chart review, face to face discussion of goals of care,  symptom assessment, coordination of care and emotional support.    Nisa Rodriguez, NP  Palliative Medicine  [facility]

## 2020-04-16 NOTE — HPI
Chief Complaint   Patient presents with    Cough       Per EMS, [place] staff reports cough x3 days. EMS reports bilateral wheezes. Room air sat=85%      Peri Aldridge is an 89 y.o. female presenting to the ED via EMS complaining of a sudden onset of shortness of breath. Patient was on a non-rebreather on arrival to the ED. Patient has no complaints and says she wants to go home soon. Patient has a past medical history of dementia. Unable to provide complete history due to patient's dementia.       
Pt is a pleasant 89 year old female with a h/o dementia, and resident of a nursing home who presents to the ER for dyspnea and hypoxia.  On arrival patient was placed on a non-rebreather.  In the ER her COVID test was positive for the coronovirus infection and her labwork pointed towards that as well. Her XRAY showed BL infiltrates vs pulmonary edema.  Of note patient was also found to be anemic ~6 and there no previous lab values to compare it to.  Stool occult was negative per the ED physician.  1 unit PRBC was ordered with 20 mg of IV lasix.  Palliative care was also consulted and after discussion with family patient was made DNR/DNI in the ED.  On interview pt is alert but disoriented and unable to contribute to history.  She askes ' can I go to my room now? '.        
No

## 2020-04-17 NOTE — PLAN OF CARE
Problem: Coping Ineffective  Goal: Effective Coping  Outcome: Ongoing, Progressing     Problem: Fall Injury Risk  Goal: Absence of Fall and Fall-Related Injury  Outcome: Ongoing, Progressing     Problem: Adult Inpatient Plan of Care  Goal: Plan of Care Review  Outcome: Ongoing, Progressing     Problem: Skin Injury Risk Increased  Goal: Skin Health and Integrity  Outcome: Ongoing, Progressing     Problem: Adult Inpatient Plan of Care  Goal: Readiness for Transition of Care  Outcome: Ongoing, Progressing

## 2020-04-17 NOTE — PROGRESS NOTES
[facility] Hospital Medicine  Progress Note    Patient Name: Peri Aldridge  MRN: [id number]  Patient Class: IP- Inpatient   Admission Date: 4/16/2020  Length of Stay: 1 days  Attending Physician: Corey Mims MD  Primary Care Provider: Jamir Myers MD        Subjective:     Principal Problem:COVID-19 virus infection        HPI:  Pt is a pleasant 89 year old female with a h/o dementia, and resident of a nursing home who presents to the ER for dyspnea and hypoxia.  On arrival patient was placed on a non-rebreather.  In the ER her COVID test was positive for the coronovirus infection and her labwork pointed towards that as well. Her XRAY showed BL infiltrates vs pulmonary edema.  Of note patient was also found to be anemic ~6 and there no previous lab values to compare it to.  Stool occult was negative per the ED physician.  1 unit PRBC was ordered with 20 mg of IV lasix.  Palliative care was also consulted and after discussion with family patient was made DNR/DNI in the ED.  On interview pt is alert but disoriented and unable to contribute to history.  She askes ' can I go to my room now? '.          Overview/Hospital Course:  No notes on file    Interval History: No acute overnight events    Review of Systems   Constitutional: Negative for chills and fever.   HENT: Negative for nosebleeds.    Eyes: Negative for discharge.   Gastrointestinal: Negative for nausea and vomiting.   Neurological:        Dementia     Objective:     Vital Signs (Most Recent):  Temp: 98.2 °F (36.8 °C) (04/17/20 1136)  Pulse: 70 (04/17/20 1200)  Resp: 20 (04/17/20 1200)  BP: 134/60 (04/17/20 1200)  SpO2: 96 % (04/17/20 1136) Vital Signs (24h Range):  Temp:  [97.6 °F (36.4 °C)-99.2 °F (37.3 °C)] 98.2 °F (36.8 °C)  Pulse:  [70-96] 70  Resp:  [16-26] 20  SpO2:  [96 %-100 %] 96 %  BP: (134-163)/(59-74) 134/60     Weight: 53.5 kg (117 lb 15.8 oz)  Body mass index is 21.58 kg/m².    Intake/Output Summary (Last 24 hours)  at 4/17/2020 1559  Last data filed at 4/17/2020 0826  Gross per 24 hour   Intake 373.33 ml   Output --   Net 373.33 ml      Physical Exam   HENT:   Head: Normocephalic and atraumatic.   Eyes: Conjunctivae are normal.   Neck: Normal range of motion. Neck supple.   Cardiovascular: Normal rate, regular rhythm and normal heart sounds.   Pulmonary/Chest:   Coarse breath sounds bilaterally   Abdominal: Soft. Bowel sounds are normal.   Musculoskeletal: She exhibits no edema.   Neurological: She is alert.   Psychiatric: She has a normal mood and affect.       Significant Labs:   CBC:   Recent Labs   Lab 04/16/20  1347 04/17/20  0455   WBC 6.83 11.12   HGB 6.5* 9.0*   HCT 23.2* 30.9*   * 398*     CMP:   Recent Labs   Lab 04/16/20  1347 04/16/20  2245    140   K 4.9 4.9    106   CO2 24 24   GLU 98 106   BUN 26 30   CREATININE 1.1 1.1   CALCIUM 7.9* 7.9*   PROT 5.6* 5.7*   ALBUMIN 2.6* 2.6*   BILITOT 0.4 1.1*   ALKPHOS 109 112   AST 29 29   ALT 16 16   ANIONGAP 10 10   EGFRNONAA 43* 43*     All pertinent labs within the past 24 hours have been reviewed.    Significant Imaging: I have reviewed and interpreted all pertinent imaging results/findings within the past 24 hours.      Assessment/Plan:      * COVID-19 virus infection  - COVID-19 positive  - Infection Control notified     - Isolation:   - Airborne, Contact and Droplet Precautions  - Cohort patients into COVID units  - N95 masks must be fit tested, wear eye protection  - 20 second hand hygiene              - Limit visitors per hospital policy              - Consolidating lab draws, nursing care, provider visits, and interventions    - Diagnostics: (leukopenia, hyponatremia, hyperferritinemia, elevated troponin, elevated d-dimer, age, and comorbidities are significant predictors of poor clinical outcome)  CBC, CMP, Procalcitonin, Ferritin, CRP, BNP, Troponin and Portable CXR    - Management:  Supplemental O2 to maintain SpO2  >92%  Continuous/intermittent Pulse Ox  Albuterol treatment PRN  Avoiding BiPAP to prevent aerosolization (including home BiPAP)     Patient made DNR/DNI in the ER.  Palliative care evaluated patient and will continue family discussions to determine they desire hospice.  Patient is a resident of a nursing home but NH will not accept COVID-19 patients.                Acute anemia  Unclear etiology.    Stool occult negative per ED provider.    s/p 1U PRBCs with appropriate rise in Hgb  Trend H/H and transfuse prn.          Dementia  Chronic.  Monitor and determine baseline.  Pt unable to contribute to history.          VTE Risk Mitigation (From admission, onward)         Ordered     Place sequential compression device  Until discontinued      04/16/20 1837     IP VTE HIGH RISK PATIENT  Once      04/16/20 1834     Place BRITTNEY hose  Until discontinued      04/16/20 1834                      Corey Mims MD  Department of Hospital Medicine   [facility]

## 2020-04-17 NOTE — PLAN OF CARE
Pt alert able to make needs known,nam meds well,denies pain,remains free from falls and pressure injuries,requires extensive assist with ADL's,O2 in use with continuous pulse Ox monitoring,safety maintained,continue monitoring,.  Problem: Coping Ineffective  Goal: Effective Coping  Outcome: Ongoing, Progressing     Problem: Fall Injury Risk  Goal: Absence of Fall and Fall-Related Injury  Outcome: Ongoing, Progressing  Intervention: Identify and Manage Contributors to Fall Injury Risk  Flowsheets (Taken 4/17/2020 1424)  Self-Care Promotion: independence encouraged; BADL personal objects within reach; meal setup provided; BADL personal routines maintained  Medication Review/Management: medications reviewed; high risk medications identified     Problem: Adult Inpatient Plan of Care  Goal: Plan of Care Review  Outcome: Ongoing, Progressing  Flowsheets (Taken 4/17/2020 1424)  Plan of Care Reviewed With: patient  Goal: Patient-Specific Goal (Individualization)  Outcome: Ongoing, Progressing  Goal: Absence of Hospital-Acquired Illness or Injury  Outcome: Ongoing, Progressing  Intervention: Identify and Manage Fall Risk  Flowsheets (Taken 4/17/2020 1424)  Safety Promotion/Fall Prevention: assistive device/personal item within reach; bed alarm set; high risk medications identified; nonskid shoes/socks when out of bed; side rails raised x 2; instructed to call staff for mobility; medications reviewed; Fall Risk reviewed with patient/family  Intervention: Prevent VTE (venous thromboembolism)  Flowsheets (Taken 4/17/2020 1424)  VTE Prevention/Management: bleeding risk assessed  Goal: Optimal Comfort and Wellbeing  Outcome: Ongoing, Progressing  Goal: Readiness for Transition of Care  Outcome: Ongoing, Progressing  Goal: Rounds/Family Conference  Outcome: Ongoing, Progressing     Problem: Skin Injury Risk Increased  Goal: Skin Health and Integrity  Outcome: Ongoing, Progressing  Intervention: Optimize Skin Protection  Flowsheets  (Taken 4/17/2020 1214)  Pressure Reduction Techniques: frequent weight shift encouraged; weight shift assistance provided; heels elevated off bed; pressure points protected  Pressure Reduction Devices: foam padding utilized; heel offloading device utilized  Skin Protection: tubing/devices free from skin contact; silicone foam dressing in place  Head of Bed (HOB): HOB at 30-45 degrees

## 2020-04-17 NOTE — ASSESSMENT & PLAN NOTE
Unclear etiology.    Stool occult negative per ED provider.    s/p 1U PRBCs with appropriate rise in Hgb  Trend H/H and transfuse prn.

## 2020-04-17 NOTE — ASSESSMENT & PLAN NOTE
- COVID-19 positive  - Infection Control notified     - Isolation:   - Airborne, Contact and Droplet Precautions  - Cohort patients into COVID units  - N95 masks must be fit tested, wear eye protection  - 20 second hand hygiene              - Limit visitors per hospital policy              - Consolidating lab draws, nursing care, provider visits, and interventions    - Diagnostics: (leukopenia, hyponatremia, hyperferritinemia, elevated troponin, elevated d-dimer, age, and comorbidities are significant predictors of poor clinical outcome)  CBC, CMP, Procalcitonin, Ferritin, CRP, BNP, Troponin and Portable CXR    - Management:  Supplemental O2 to maintain SpO2 >92%  Continuous/intermittent Pulse Ox  Albuterol treatment PRN  Avoiding BiPAP to prevent aerosolization (including home BiPAP)     Patient made DNR/DNI in the ER.  Palliative care evaluated patient and will continue family discussions to determine they desire hospice.  Patient is a resident of a nursing home but NH will not accept COVID-19 patients.

## 2020-04-17 NOTE — PLAN OF CARE
04/16/20 1857   Discharge Assessment   Assessment Type Discharge Planning Assessment   Confirmed/corrected address and phone number on facesheet? Yes   Assessment information obtained from? Medical Record   Expected Length of Stay (days) 2   Prior to hospitilization cognitive status: Alert/Oriented  (Somewhat disoriented.)   Prior to hospitalization functional status: Needs Assistance   Current cognitive status: Not Oriented to Place;Not Oriented to Time   Current Functional Status: Needs Assistance   Facility Arrived From: [facility]   [facility] With facility resident   Able to Return to Prior Arrangements yes   Is patient able to care for self after discharge? No   Who are your caregiver(s) and their phone number(s)? POA:  Christy Moya; daughter; [phone]   Patient's perception of discharge disposition nursing home   Readmission Within the Last 30 Days no previous admission in last 30 days   Patient currently being followed by outpatient case management? No   Patient currently receives any other outside agency services? No   Part D Coverage BCBS Medicare Supplement   Do you have any problems affording any of your prescribed medications? No   Is the patient taking medications as prescribed? yes   Does the patient have transportation home? Yes   Transportation Anticipated   (Stretcher or wheelchair van billed to NH.)   Dialysis Name and Scheduled days n/a   Does the patient receive services at the [facility]? No   Discharge Plan A Return to nursing home   Discharge Plan B Return to Nursing Home   DME Needed Upon Discharge  none   Patient/Family in Agreement with Plan other (see comments)  (Completed from record.)   Vanessa Duncan LMSW, [facility]  4/16/20

## 2020-04-17 NOTE — PROGRESS NOTES
F/u with patient. She is alert and oriented to self. She has NRB on. She c/o feeling ore under her shoulders and back. Repositioned and placed 2 pillows under her shoulder/back and she said this felt much better. She wanted water to drink and tolerated well. She was cold and air turned up to warm room. Will continue to follow. If patient makes a decline will discuss inpatient hospice with COVID + but at this time daughter would like to continue current treatment plan over the weekend unless a decline. Updated Dr Mims and Delia COLLINS

## 2020-04-17 NOTE — NURSING
Patient arrived to 435 from ED via stretcher - PRBCs infusing to left arm without difficulty. Patient alert but disoriented to time, place and situation. No sign or symptom of acute distress observed.

## 2020-04-17 NOTE — SUBJECTIVE & OBJECTIVE
Interval History: No acute overnight events    Review of Systems   Constitutional: Negative for chills and fever.   HENT: Negative for nosebleeds.    Eyes: Negative for discharge.   Gastrointestinal: Negative for nausea and vomiting.   Neurological:        Dementia     Objective:     Vital Signs (Most Recent):  Temp: 98.2 °F (36.8 °C) (04/17/20 1136)  Pulse: 70 (04/17/20 1200)  Resp: 20 (04/17/20 1200)  BP: 134/60 (04/17/20 1200)  SpO2: 96 % (04/17/20 1136) Vital Signs (24h Range):  Temp:  [97.6 °F (36.4 °C)-99.2 °F (37.3 °C)] 98.2 °F (36.8 °C)  Pulse:  [70-96] 70  Resp:  [16-26] 20  SpO2:  [96 %-100 %] 96 %  BP: (134-163)/(59-74) 134/60     Weight: 53.5 kg (117 lb 15.8 oz)  Body mass index is 21.58 kg/m².    Intake/Output Summary (Last 24 hours) at 4/17/2020 1559  Last data filed at 4/17/2020 0826  Gross per 24 hour   Intake 373.33 ml   Output --   Net 373.33 ml      Physical Exam   HENT:   Head: Normocephalic and atraumatic.   Eyes: Conjunctivae are normal.   Neck: Normal range of motion. Neck supple.   Cardiovascular: Normal rate, regular rhythm and normal heart sounds.   Pulmonary/Chest:   Coarse breath sounds bilaterally   Abdominal: Soft. Bowel sounds are normal.   Musculoskeletal: She exhibits no edema.   Neurological: She is alert.   Psychiatric: She has a normal mood and affect.       Significant Labs:   CBC:   Recent Labs   Lab 04/16/20  1347 04/17/20  0455   WBC 6.83 11.12   HGB 6.5* 9.0*   HCT 23.2* 30.9*   * 398*     CMP:   Recent Labs   Lab 04/16/20  1347 04/16/20  2245    140   K 4.9 4.9    106   CO2 24 24   GLU 98 106   BUN 26 30   CREATININE 1.1 1.1   CALCIUM 7.9* 7.9*   PROT 5.6* 5.7*   ALBUMIN 2.6* 2.6*   BILITOT 0.4 1.1*   ALKPHOS 109 112   AST 29 29   ALT 16 16   ANIONGAP 10 10   EGFRNONAA 43* 43*     All pertinent labs within the past 24 hours have been reviewed.    Significant Imaging: I have reviewed and interpreted all pertinent imaging results/findings within the past 24  hours.

## 2020-04-18 NOTE — NURSING
Pt oxygen changed to venting mask 12L pt O2 SATs dropped to 49% required Non rebreathe mask to bring SATs to 95%.

## 2020-04-18 NOTE — PLAN OF CARE
Member in bed, anxious. Skin intact with blanchable redness to buttocks and back. Repositioned every 2 H for pressure relief, rubbed back per request for comfort. Heels floated. Oxygen per 100% NRB mask with SpO2 of 97%. Daughter inquired about Skype contact with family, will f/u with charge nurse. Shift uneventful.

## 2020-04-18 NOTE — PROGRESS NOTES
[facility] Hospital Medicine  Progress Note    Patient Name: Peri Aldridge  MRN: [id number]  Patient Class: IP- Inpatient   Admission Date: 4/16/2020  Length of Stay: 2 days  Attending Physician: Corey Mims MD  Primary Care Provider: Jamir Myers MD        Subjective:     Principal Problem:COVID-19 virus infection        HPI:  Pt is a pleasant 89 year old female with a h/o dementia, and resident of a nursing home who presents to the ER for dyspnea and hypoxia.  On arrival patient was placed on a non-rebreather.  In the ER her COVID test was positive for the coronovirus infection and her labwork pointed towards that as well. Her XRAY showed BL infiltrates vs pulmonary edema.  Of note patient was also found to be anemic ~6 and there no previous lab values to compare it to.  Stool occult was negative per the ED physician.  1 unit PRBC was ordered with 20 mg of IV lasix.  Palliative care was also consulted and after discussion with family patient was made DNR/DNI in the ED.  On interview pt is alert but disoriented and unable to contribute to history.  She askes ' can I go to my room now? '.          Overview/Hospital Course:  No notes on file    Interval History: Has been anxious overnight. Nursing staff reporting that she takes Klonopin BID at home but unable to clarify. Still on NRB but will start to titrate down if possible. Afebrile. No other overnight complaints.     Review of Systems   Constitutional: Negative for chills and fever.   HENT: Negative for nosebleeds.    Eyes: Negative for discharge.   Gastrointestinal: Negative for nausea and vomiting.   Neurological:        Dementia     Objective:     Vital Signs (Most Recent):  Temp: 98.2 °F (36.8 °C) (04/18/20 0748)  Pulse: 69 (04/18/20 0748)  Resp: (!) 24 (04/18/20 0748)  BP: 128/70 (04/18/20 0748)  SpO2: 100 % (04/18/20 0748) Vital Signs (24h Range):  Temp:  [97 °F (36.1 °C)-99.2 °F (37.3 °C)] 98.2 °F (36.8 °C)  Pulse:  [69-70]  69  Resp:  [20-33] 24  SpO2:  [96 %-100 %] 100 %  BP: (112-163)/(55-71) 128/70     Weight: 53.5 kg (117 lb 15.8 oz)  Body mass index is 21.58 kg/m².    Intake/Output Summary (Last 24 hours) at 4/18/2020 1033  Last data filed at 4/18/2020 0822  Gross per 24 hour   Intake 480 ml   Output --   Net 480 ml      Physical Exam   HENT:   Head: Normocephalic and atraumatic.   Eyes: Conjunctivae are normal.   Neck: Normal range of motion. Neck supple.   Cardiovascular: Normal rate, regular rhythm and normal heart sounds.   Pulmonary/Chest:   Coarse breath sounds bilaterally   Abdominal: Soft. Bowel sounds are normal.   Musculoskeletal: She exhibits no edema.   Neurological: She is alert.   Psychiatric: She has a normal mood and affect.       Significant Labs:   CBC:   Recent Labs   Lab 04/16/20  1347 04/17/20  0455 04/18/20  0815   WBC 6.83 11.12 9.31   HGB 6.5* 9.0* 8.8*   HCT 23.2* 30.9* 30.7*   * 398* 420*     CMP:   Recent Labs   Lab 04/16/20  1347 04/16/20  2245    140   K 4.9 4.9    106   CO2 24 24   GLU 98 106   BUN 26 30   CREATININE 1.1 1.1   CALCIUM 7.9* 7.9*   PROT 5.6* 5.7*   ALBUMIN 2.6* 2.6*   BILITOT 0.4 1.1*   ALKPHOS 109 112   AST 29 29   ALT 16 16   ANIONGAP 10 10   EGFRNONAA 43* 43*     All pertinent labs within the past 24 hours have been reviewed.    Significant Imaging: I have reviewed and interpreted all pertinent imaging results/findings within the past 24 hours.      Assessment/Plan:      * COVID-19 virus infection  - COVID-19 positive  - Infection Control notified     - Isolation:   - Airborne, Contact and Droplet Precautions  - Cohort patients into COVID units  - N95 masks must be fit tested, wear eye protection  - 20 second hand hygiene              - Limit visitors per hospital policy              - Consolidating lab draws, nursing care, provider visits, and interventions    - Diagnostics: (leukopenia, hyponatremia, hyperferritinemia, elevated troponin, elevated d-dimer, age,  and comorbidities are significant predictors of poor clinical outcome)  CBC, CMP, Procalcitonin, Ferritin, CRP, BNP, Troponin and Portable CXR    - Management:  Supplemental O2 to maintain SpO2 >92%  Continuous/intermittent Pulse Ox  Albuterol treatment PRN  Avoiding BiPAP to prevent aerosolization (including home BiPAP)     Patient made DNR/DNI in the ER.  Palliative care evaluated patient and will continue family discussions to determine they desire hospice.  Patient is a resident of a nursing home but NH will not accept COVID-19 patients.                Acute anemia  Unclear etiology.    Stool occult negative per ED provider.    s/p 1U PRBCs with appropriate rise in Hgb  Trend H/H and transfuse prn.          Dementia  Chronic.  Monitor and determine baseline.  Pt unable to contribute to history.        Palliative care encounter  - Daughter would like to continue current treatment plan over the weekend unless a decline  - Cont Palliative care consult      VTE Risk Mitigation (From admission, onward)         Ordered     Place sequential compression device  Until discontinued      04/16/20 1837     IP VTE HIGH RISK PATIENT  Once      04/16/20 1834     Place BRITTNEY hose  Until discontinued      04/16/20 1834                      Corey Mims MD  Department of Hospital Medicine   [facility]

## 2020-04-18 NOTE — SUBJECTIVE & OBJECTIVE
Interval History: Has been anxious overnight. Nursing staff reporting that she takes Klonopin BID at home but unable to clarify. Afebrile. No other overnight complaints.     Review of Systems   Constitutional: Negative for chills and fever.   HENT: Negative for nosebleeds.    Eyes: Negative for discharge.   Gastrointestinal: Negative for nausea and vomiting.   Neurological:        Dementia     Objective:     Vital Signs (Most Recent):  Temp: 98.2 °F (36.8 °C) (04/18/20 0748)  Pulse: 69 (04/18/20 0748)  Resp: (!) 24 (04/18/20 0748)  BP: 128/70 (04/18/20 0748)  SpO2: 100 % (04/18/20 0748) Vital Signs (24h Range):  Temp:  [97 °F (36.1 °C)-99.2 °F (37.3 °C)] 98.2 °F (36.8 °C)  Pulse:  [69-70] 69  Resp:  [20-33] 24  SpO2:  [96 %-100 %] 100 %  BP: (112-163)/(55-71) 128/70     Weight: 53.5 kg (117 lb 15.8 oz)  Body mass index is 21.58 kg/m².    Intake/Output Summary (Last 24 hours) at 4/18/2020 1033  Last data filed at 4/18/2020 0822  Gross per 24 hour   Intake 480 ml   Output --   Net 480 ml      Physical Exam   HENT:   Head: Normocephalic and atraumatic.   Eyes: Conjunctivae are normal.   Neck: Normal range of motion. Neck supple.   Cardiovascular: Normal rate, regular rhythm and normal heart sounds.   Pulmonary/Chest:   Coarse breath sounds bilaterally   Abdominal: Soft. Bowel sounds are normal.   Musculoskeletal: She exhibits no edema.   Neurological: She is alert.   Psychiatric: She has a normal mood and affect.       Significant Labs:   CBC:   Recent Labs   Lab 04/16/20  1347 04/17/20  0455 04/18/20  0815   WBC 6.83 11.12 9.31   HGB 6.5* 9.0* 8.8*   HCT 23.2* 30.9* 30.7*   * 398* 420*     CMP:   Recent Labs   Lab 04/16/20  1347 04/16/20  2245    140   K 4.9 4.9    106   CO2 24 24   GLU 98 106   BUN 26 30   CREATININE 1.1 1.1   CALCIUM 7.9* 7.9*   PROT 5.6* 5.7*   ALBUMIN 2.6* 2.6*   BILITOT 0.4 1.1*   ALKPHOS 109 112   AST 29 29   ALT 16 16   ANIONGAP 10 10   EGFRNONAA 43* 43*     All pertinent  labs within the past 24 hours have been reviewed.    Significant Imaging: I have reviewed and interpreted all pertinent imaging results/findings within the past 24 hours.

## 2020-04-18 NOTE — PLAN OF CARE
Pt alert able to make needs known,nam meds well,denies pain,remains free from falls and pressure injuries,requires extensive assist with ADL's,O2 in use with continuous pulse Ox monitoring,safety maintained,continue monitoring,.  Problem: Coping Ineffective  Goal: Effective Coping  Outcome: Ongoing, Progressing  Intervention: Support and Enhance Coping Strategies  Flowsheets (Taken 4/18/2020 1424)  Environmental Support: calm environment promoted; rest periods encouraged     Problem: Fall Injury Risk  Goal: Absence of Fall and Fall-Related Injury  Outcome: Ongoing, Progressing  Intervention: Identify and Manage Contributors to Fall Injury Risk  Flowsheets (Taken 4/18/2020 1424)  Self-Care Promotion: independence encouraged; BADL personal routines maintained; BADL personal objects within reach; meal setup provided  Medication Review/Management: medications reviewed; high risk medications identified     Problem: Adult Inpatient Plan of Care  Goal: Plan of Care Review  Outcome: Ongoing, Progressing  Flowsheets (Taken 4/18/2020 1424)  Plan of Care Reviewed With: patient  Goal: Patient-Specific Goal (Individualization)  Outcome: Ongoing, Progressing  Goal: Absence of Hospital-Acquired Illness or Injury  Outcome: Ongoing, Progressing  Intervention: Identify and Manage Fall Risk  Flowsheets (Taken 4/18/2020 1424)  Safety Promotion/Fall Prevention: assistive device/personal item within reach; bed alarm set; Fall Risk reviewed with patient/family; medications reviewed; side rails raised x 2; room near unit station; instructed to call staff for mobility; nonskid shoes/socks when out of bed  Goal: Optimal Comfort and Wellbeing  Outcome: Ongoing, Progressing  Goal: Readiness for Transition of Care  Outcome: Ongoing, Progressing  Goal: Rounds/Family Conference  Outcome: Ongoing, Progressing     Problem: Skin Injury Risk Increased  Goal: Skin Health and Integrity  Outcome: Ongoing, Progressing  Intervention: Optimize Skin  Protection  Flowsheets (Taken 4/18/2020 1424)  Pressure Reduction Techniques: frequent weight shift encouraged; heels elevated off bed; weight shift assistance provided  Skin Protection: tubing/devices free from skin contact  Head of Bed (HOB): HOB at 30-45 degrees

## 2020-04-18 NOTE — ASSESSMENT & PLAN NOTE
- Daughter would like to continue current treatment plan over the weekend unless a decline  - Cont Palliative care consult

## 2020-04-19 NOTE — NURSING
PRN Morphine given per MD order. Patient comfortable. 02 98% on nonrebreather. HR 68. No verbalized needs at this time. Safety measures maintained. Will continue to monitor.     Call placed to patient's daughter Christy and update given per patient request.

## 2020-04-19 NOTE — NURSING
Patient awake and sitting in bed. 02 100% on nonrebreather. HR 70. Patient repositioned. No verbalized needs at this time. Safety measures maintained. Will continue to monitor

## 2020-04-19 NOTE — NURSING
Patient asleep but arousable. 02 100% on nonrebreather via tele monitor. No verbalized needs at this time. Safety measures maintained. Will continue to monitor

## 2020-04-19 NOTE — ASSESSMENT & PLAN NOTE
- COVID-19 positive  - Infection Control notified     - Isolation:   - Airborne, Contact and Droplet Precautions  - Cohort patients into COVID units  - N95 masks must be fit tested, wear eye protection  - 20 second hand hygiene              - Limit visitors per hospital policy              - Consolidating lab draws, nursing care, provider visits, and interventions    - Diagnostics: (leukopenia, hyponatremia, hyperferritinemia, elevated troponin, elevated d-dimer, age, and comorbidities are significant predictors of poor clinical outcome)  CBC, CMP, Procalcitonin, Ferritin, CRP, BNP, Troponin and Portable CXR    - Management:  Supplemental O2 to maintain SpO2 >92%  Continuous/intermittent Pulse Ox  Albuterol treatment PRN  Avoiding BiPAP to prevent aerosolization (including home BiPAP)   Morphine prn ordered to keep her comffortable    Patient made DNR/DNI in the ER.  Palliative care evaluated patient and will continue family discussions to determine they desire hospice.  Patient is a resident of a nursing home but NH will not accept COVID-19 patients.

## 2020-04-19 NOTE — HOSPITAL COURSE
This patient was admitted by COVID-19 viral infection. She was started on supplemental O2 to keep O2 sat > 92%. No treatment was started for COVID-19 since the patient is DNR/DNI and we are awaiting the family to determine if she will be comfort care. Morphine was added on to her regimen to keep her comfortable.     While in the ER, she was transfused with 1U PRBCs for a Hgb 6.5. Her Hgb lux appropriately and remained stable. We continued her Klonopin BID after her family confirmed that she has been taking this for many years.    Patient cannot get off of non re-breather 100 percent -spoke with the daughter and had hospice discussion. Patient more alert however today

## 2020-04-19 NOTE — NURSING
Patient awake in bed. Verbalized SOB. Oxygen in place @ 15L nonrebreather. Sats %. Verbalized comfort and deep breathing with the patient. Assessment done. Attempted AM meds per MD order. Patient refused. Also refused to eat breakfast. Plan of care reviewed with patient. All questions answered. Safety measures maintained. Call light in reach. Will notify MD of SOB complaints and request orders. Will continue to monitor.

## 2020-04-19 NOTE — SUBJECTIVE & OBJECTIVE
Interval History: Has been anxious overnight. Nursing staff reporting that she takes Klonopin BID at home but unable to clarify. Afebrile. No other overnight complaints.     Review of Systems   Constitutional: Negative for chills and fever.   HENT: Negative for nosebleeds.    Eyes: Negative for discharge.   Gastrointestinal: Negative for nausea and vomiting.   Neurological:        Dementia     Objective:     Vital Signs (Most Recent):  Temp: 97.7 °F (36.5 °C) (04/19/20 0703)  Pulse: 69 (04/19/20 0703)  Resp: 14 (04/19/20 0703)  BP: (!) 147/66 (04/19/20 0703)  SpO2: 97 % (04/19/20 1035) Vital Signs (24h Range):  Temp:  [97.4 °F (36.3 °C)-98.4 °F (36.9 °C)] 97.7 °F (36.5 °C)  Pulse:  [68-80] 69  Resp:  [14-30] 14  SpO2:  [80 %-100 %] 97 %  BP: (127-158)/(56-70) 147/66     Weight: 53.5 kg (117 lb 15.8 oz)  Body mass index is 21.58 kg/m².    Intake/Output Summary (Last 24 hours) at 4/19/2020 1254  Last data filed at 4/19/2020 0745  Gross per 24 hour   Intake 120 ml   Output --   Net 120 ml      Physical Exam   HENT:   Head: Normocephalic and atraumatic.   Eyes: Conjunctivae are normal.   Neck: Normal range of motion. Neck supple.   Cardiovascular: Normal rate, regular rhythm and normal heart sounds.   Pulmonary/Chest:   Coarse breath sounds bilaterally   Abdominal: Soft. Bowel sounds are normal.   Musculoskeletal: She exhibits no edema.   Neurological: She is alert.   Psychiatric: She has a normal mood and affect.       Significant Labs:   CBC:   Recent Labs   Lab 04/18/20  0815 04/19/20  0415   WBC 9.31 13.87*   HGB 8.8* 9.8*   HCT 30.7* 35.5*   * 509*     CMP:   Recent Labs   Lab 04/18/20  1906      K 4.5   *   CO2 21*   GLU 67*   BUN 34*   CREATININE 0.8   CALCIUM 7.8*   PROT 5.4*   ALBUMIN 2.3*   BILITOT 0.5   ALKPHOS 97   AST 27   ALT 15   ANIONGAP 10   EGFRNONAA >60     All pertinent labs within the past 24 hours have been reviewed.    Significant Imaging: I have reviewed and interpreted all  pertinent imaging results/findings within the past 24 hours.

## 2020-04-19 NOTE — NURSING
Patient awake and alert. Repositioned in bed. Water given and mouth care done. 02 connected via nonrebreather. Gauze placed to mask strings bilaterally for skin protection. No verbalized needs at this time. Safety measures maintained. Will continue to monitor

## 2020-04-20 PROBLEM — Z95.0 HISTORY OF CARDIAC PACEMAKER: Status: ACTIVE | Noted: 2020-01-01

## 2020-04-20 PROBLEM — J96.01 ACUTE HYPOXEMIC RESPIRATORY FAILURE: Status: ACTIVE | Noted: 2020-01-01

## 2020-04-20 NOTE — ASSESSMENT & PLAN NOTE
Chronic.  Monitor and determine baseline.  Pt unable to contribute to history.    Restart home aricept and namenda

## 2020-04-20 NOTE — NURSING
Ordered labs not collected. Lab draw attempted x 4 with no success secondary to poor venous return. MD notified.

## 2020-04-20 NOTE — ASSESSMENT & PLAN NOTE
Patient's oxygen needs are still elevated despite the patient awakening  Patient covid positive and today she has an elevated wbc with a shift  Coworker started rocephin and azithromycin and will continue  Will recheck chest xray in am as admit BNP over 3500 s/p 20 mg iv lasix on admit

## 2020-04-20 NOTE — NURSING
Patient awake and watching television. No signs of distress. Nonrebreather in place. Peripheral IV flushed. Denies any needs at this time. Safety measures maintained. Call light in reach. Will continue to monitor.

## 2020-04-20 NOTE — NURSING
MD at bedside requesting switch to venti mask. Attempted to switch patient from nonrebreather to 50% vent mask. Sats dropped immediately to 89%. Nonrebreather replaced.     Assisted patient with call to daughter.

## 2020-04-20 NOTE — NURSING
"Patient laying in bed. No acute distress. States "feeling much better today". Oxygen in place via nonrebreather @ 100%. Sat 95. HR 74 via continuous pulse ox. No SOB or complaints verbalized. Patient AAO except time. Assessment done. Plan of care reviewed. Mouth care done. Questions answered. Safety measures maintained. Bed alarm on. Call light in reach. Will continue to monitor.   "

## 2020-04-20 NOTE — NURSING
Patient asleep but arousable. No signs of distress. Denies SOB. Nonrebreather in place. IV infusing as ordered. Safety measures maintained. Call light in reach. Will continue to monitor.    Call placed to daughter Christy and update given per patient request.

## 2020-04-20 NOTE — PROGRESS NOTES
F/U with patient. She is alert and oriented to self. She is smiling and sitting up in bed with NRB on eating with assistance of bedside nurse.  She is very engaging. She wants to know when she can get back to her room. She denies pain. Dr Murillo present and will see if patient able to tolerate weaning of oxygen and get labs. Will f/u in am

## 2020-04-20 NOTE — SUBJECTIVE & OBJECTIVE
Interval History: patient awake and alert but not oriented. Did discuss POC with palliative team, nurse , daughter and case management. Patient a DNR and has covid plus a cardiac history. Will continue to see how patient does overnight and if non re-breather cannot be discontinued would consider [facility]     Review of Systems   Unable to perform ROS: Dementia     Objective:     Vital Signs (Most Recent):  Temp: 98.4 °F (36.9 °C) (04/20/20 1138)  Pulse: 70 (04/20/20 1138)  Resp: 20 (04/20/20 1138)  BP: (!) 113/55 (04/20/20 1138)  SpO2: 97 % (04/20/20 1138) Vital Signs (24h Range):  Temp:  [96.2 °F (35.7 °C)-98.4 °F (36.9 °C)] 98.4 °F (36.9 °C)  Pulse:  [69-71] 70  Resp:  [15-22] 20  SpO2:  [96 %-100 %] 97 %  BP: (113-139)/(55-62) 113/55     Weight: 53.5 kg (117 lb 15.8 oz)  Body mass index is 21.58 kg/m².    Intake/Output Summary (Last 24 hours) at 4/20/2020 1411  Last data filed at 4/20/2020 0400  Gross per 24 hour   Intake 120 ml   Output 1 ml   Net 119 ml      Physical Exam   HENT:   Head: Normocephalic and atraumatic.   Eyes: Conjunctivae are normal.   Neck: Normal range of motion. Neck supple.   Cardiovascular: Normal rate, regular rhythm and normal heart sounds.   Pulmonary/Chest:   Coarse breath sounds bilaterally   Abdominal: Soft. Bowel sounds are normal.   Musculoskeletal: She exhibits no edema.   Neurological: She is alert.   Psychiatric: She has a normal mood and affect.       Significant Labs:   CBC:   Recent Labs   Lab 04/19/20  0415   WBC 13.87*   HGB 9.8*   HCT 35.5*   *     CMP:   Recent Labs   Lab 04/18/20  1906      K 4.5   *   CO2 21*   GLU 67*   BUN 34*   CREATININE 0.8   CALCIUM 7.8*   PROT 5.4*   ALBUMIN 2.3*   BILITOT 0.5   ALKPHOS 97   AST 27   ALT 15   ANIONGAP 10   EGFRNONAA >60       Significant Imaging: no new reports

## 2020-04-20 NOTE — ASSESSMENT & PLAN NOTE
- COVID-19 positive  - Infection Control notified   - Isolation:   - Airborne, Contact and Droplet Precautions  - Cohort patients into COVID units  - N95 masks must be fit tested, wear eye protection  - 20 second hand hygiene              - Limit visitors per hospital policy              - Consolidating lab draws, nursing care, provider visits, and interventions  Patient made DNR/DNI in the ER.  Palliative care evaluated patient and will continue family discussions to determine they desire hospice.  Patient is a resident of a nursing home but NH will not accept COVID-19 patients.

## 2020-04-20 NOTE — NURSING
Patient awake and alert laying in bed. No sign distress. No SOB verbalized at this time. Meds given per MD order. Safety measures maintained. Call light in reach. Will continue to monitor.

## 2020-04-20 NOTE — PROGRESS NOTES
[facility] Hospital Medicine  Progress Note    Patient Name: Peri Aldridge  MRN: [id number]  Patient Class: IP- Inpatient   Admission Date: 4/16/2020  Length of Stay: 4 days  Attending Physician: Corey Mims MD  Primary Care Provider: Jamir Myers MD        Subjective:     Principal Problem:COVID-19 virus infection        HPI:  Pt is a pleasant 89 year old female with a h/o dementia, and resident of a nursing home who presents to the ER for dyspnea and hypoxia.  On arrival patient was placed on a non-rebreather.  In the ER her COVID test was positive for the coronovirus infection and her labwork pointed towards that as well. Her XRAY showed BL infiltrates vs pulmonary edema.  Of note patient was also found to be anemic ~6 and there no previous lab values to compare it to.  Stool occult was negative per the ED physician.  1 unit PRBC was ordered with 20 mg of IV lasix.  Palliative care was also consulted and after discussion with family patient was made DNR/DNI in the ED.  On interview pt is alert but disoriented and unable to contribute to history.  She askes ' can I go to my room now? '.          Overview/Hospital Course:  This patient was admitted by COVID-19 viral infection. She was started on supplemental O2 to keep O2 sat > 92%. No treatment was started for COVID-19 since the patient is DNR/DNI and we are awaiting the family to determine if she will be comfort care. Morphine was added on to her regimen to keep her comfortable.     While in the ER, she was transfused with 1U PRBCs for a Hgb 6.5. Her Hgb lux appropriately and remained stable. We continued her Klonopin BID after her family confirmed that she has been taking this for many years.    Patient cannot get off of non re-breather 100 percent -spoke with the daughter and had hospice discussion. Patient more alert however today     Interval History: patient awake and alert but not oriented. Did discuss POC with palliative  team, nurse , daughter and case management. Patient a DNR and has covid plus a cardiac history. Will continue to see how patient does overnight and if non re-breather cannot be discontinued would consider [facility] with [facility]     Review of Systems   Unable to perform ROS: Dementia     Objective:     Vital Signs (Most Recent):  Temp: 98.4 °F (36.9 °C) (04/20/20 1138)  Pulse: 70 (04/20/20 1138)  Resp: 20 (04/20/20 1138)  BP: (!) 113/55 (04/20/20 1138)  SpO2: 97 % (04/20/20 1138) Vital Signs (24h Range):  Temp:  [96.2 °F (35.7 °C)-98.4 °F (36.9 °C)] 98.4 °F (36.9 °C)  Pulse:  [69-71] 70  Resp:  [15-22] 20  SpO2:  [96 %-100 %] 97 %  BP: (113-139)/(55-62) 113/55     Weight: 53.5 kg (117 lb 15.8 oz)  Body mass index is 21.58 kg/m².    Intake/Output Summary (Last 24 hours) at 4/20/2020 1411  Last data filed at 4/20/2020 0400  Gross per 24 hour   Intake 120 ml   Output 1 ml   Net 119 ml      Physical Exam   HENT:   Head: Normocephalic and atraumatic.   Eyes: Conjunctivae are normal.   Neck: Normal range of motion. Neck supple.   Cardiovascular: Normal rate, regular rhythm and normal heart sounds.   Pulmonary/Chest:   Coarse breath sounds bilaterally   Abdominal: Soft. Bowel sounds are normal.   Musculoskeletal: She exhibits no edema.   Neurological: She is alert.   Psychiatric: She has a normal mood and affect.       Significant Labs:   CBC:   Recent Labs   Lab 04/19/20  0415   WBC 13.87*   HGB 9.8*   HCT 35.5*   *     CMP:   Recent Labs   Lab 04/18/20  1906      K 4.5   *   CO2 21*   GLU 67*   BUN 34*   CREATININE 0.8   CALCIUM 7.8*   PROT 5.4*   ALBUMIN 2.3*   BILITOT 0.5   ALKPHOS 97   AST 27   ALT 15   ANIONGAP 10   EGFRNONAA >60       Significant Imaging: no new reports      Assessment/Plan:      * COVID-19 virus infection  - COVID-19 positive  - Infection Control notified   - Isolation:   - Airborne, Contact and Droplet Precautions  - Cohort patients into COVID units  - N95 masks  must be fit tested, wear eye protection  - 20 second hand hygiene              - Limit visitors per hospital policy              - Consolidating lab draws, nursing care, provider visits, and interventions  Patient made DNR/DNI in the ER.  Palliative care evaluated patient and will continue family discussions to determine they desire hospice.  Patient is a resident of a nursing home but NH will not accept COVID-19 patients.    Acute hypoxemic respiratory failure  Patient's oxygen needs are still elevated despite the patient awakening  Patient covid positive and today she has an elevated wbc with a shift  Coworker started rocephin and azithromycin and will continue  Will recheck chest xray in am as admit BNP over 3500 s/p 20 mg iv lasix on admit      History of cardiac pacemaker  BNP and chest xray on admit c/w CHF especially with hx of anemia and pacemaker      Anemia  Unclear etiology.    Stool occult negative per ED provider.    s/p 1U PRBCs with appropriate rise in Hgb  Trend H/H and transfuse prn.        Dementia  Chronic.  Monitor and determine baseline.  Pt unable to contribute to history.    Restart home aricept and namenda    Palliative care encounter  - Daughter would like to continue current treatment plan over the weekend unless a decline  - Cont Palliative care consult      VTE Risk Mitigation (From admission, onward)         Ordered     enoxaparin injection 40 mg  Daily      04/20/20 1005     Place sequential compression device  Until discontinued      04/16/20 1837     IP VTE HIGH RISK PATIENT  Once      04/16/20 1834     Place BRITTNEY hose  Until discontinued      04/16/20 1834                      Shannon Haines MD  Department of Hospital Medicine   [facility]

## 2020-04-21 NOTE — NURSING
Dr. Berry pronounced the patient at 2815. [phone] Christy Moya,  the patient daughter was called at [phone] and informed of the patient's death. The  home that the daughter selected was [facility] at [address]. Daughter asked  to  the patient's belonging from Security Office on the 1st floor of the hospital.

## 2020-04-21 NOTE — NURSING
DENZEL called # Paris Ramos. [facility] notified, body ok to be released to  home. [facility] in [place]. Body in [facility]. Transport was accidentally put in for pts roommate. Pts body is in the morgue. Belongings were sent with security and family is aware.

## 2020-04-21 NOTE — NURSING
Monitor tech called and spoke with charge nurse Betty Serrano RN that the patient HR was dropping. Pt found without a pulse at 0229. DNR status noted. Dr. Berry notified at 0240.

## 2020-04-28 NOTE — DISCHARGE SUMMARY
[facility] Hospital Medicine  Discharge Summary      Patient Name: Peri Aldridge  MRN: [id number]  Admission Date: 4/16/2020  Hospital Length of Stay: 5 days  Discharge Date and Time:  04/21/2020 time of death 2:29 am  Attending Physician: No att. providers found   Discharging Provider: Shannon Haines MD  Primary Care Provider: Jamir Myers MD        HPI: Pt is a pleasant 89 year old female with a h/o dementia, and resident of a nursing home who presents to the ER for dyspnea and hypoxia.  On arrival patient was placed on a non-rebreather.  In the ER her COVID test was positive for the coronovirus infection and her labwork pointed towards that as well. Her XRAY showed BL infiltrates vs pulmonary edema.  Of note patient was also found to be anemic ~6 and there no previous lab values to compare it to.  Stool occult was negative per the ED physician.  1 unit PRBC was ordered with 20 mg of IV lasix.  Palliative care was also consulted and after discussion with family patient was made DNR/DNI in the ED.  On interview pt is alert but disoriented and unable to contribute to history.  She askes ' can I go to my room now? '  * No surgery found *      Hospital Course:   This patient was admitted by COVID-19 viral infection. She was started on supplemental O2 to keep O2 sat > 92%. No treatment was started for COVID-19 since the patient is DNR/DNI and we are awaiting the family to determine if she will be comfort care. Morphine was added on to her regimen to keep her comfortable.      While in the ER, she was transfused with 1U PRBCs for a Hgb 6.5. Her Hgb lux appropriately and remained stable. We continued her Klonopin BID after her family confirmed that she has been taking this for many years.     Patient cannot get off of non re-breather 100 percent -spoke with the daughter and had hospice discussion. Patient more alert however today   Consults:   Consults (From admission, onward)        Status  Ordering Provider     Inpatient consult to Palliative Care  Once     Provider:  KWADWO Garner CHARLANE H.          Final Active Diagnoses:    Diagnosis Date Noted POA    PRINCIPAL PROBLEM:  COVID-19 virus infection [U07.1] 2020 Yes    Acute hypoxemic respiratory failure [J96.01] 2020 Yes    History of cardiac pacemaker [Z95.0] 2020 Not Applicable    Dementia [F03.90] 2020 Yes    Anemia [D64.9] 2020 Yes    Palliative care encounter [Z51.5]  Not Applicable      Problems Resolved During this Admission:      Discharged Condition:     Disposition:     Follow Up:    Patient Instructions:   No discharge procedures on file.  Medications:  None (patient  at medical facility)    Significant Diagnostic Studies:     Pending Diagnostic Studies:     Procedure Component Value Units Date/Time    EKG 12-lead [[id number]]     Order Status:  Sent Lab Status:  No result         Indwelling Lines/Drains at time of discharge:   Lines/Drains/Airways     None                 Time spent on the discharge of patient: 25minutes  Patient was seen and examined on the date of discharge and determined to be suitable for discharge.         Shannon Haines MD  Department of Hospital Medicine  [facility]

## 2020-04-30 NOTE — PHYSICIAN QUERY
"PT Name: Peri Aldridge  MR #: [id number]  Respiratory Condition Clarification    Selam Johnson RN, CCDS; [email or web address]    This form is a permanent document in the medical record.  Query Date: April 30, 2020  By submitting this query, we are merely seeking further clarification of documentation.   Please utilize your independent clinical judgment when addressing the question(s) below.    The medical record contains the following:   Indicators   Supporting Clinical Findings Location in Medical Record   x "Pulmonary Edema" Her XRAY showed BL infiltrates vs pulmonary edema.   H&P; Hosp pn 4/17-20; DCS   x Wheezing, Productive cough, SOB, TOMLINSON, Use of accessory muscles Presents to ER for dyspnea and hypoxia DCS   x Radiology Findings Abnormal chest radiograph with features more typical of pulmonary edema than of pneumonia, noting that COVID-19 pneumonia cannot be excluded.    Findings suggest pulmonary edema but pneumonia including COVID-19 pneumonia  cannot be excluded.   ED MD Note   x CHF documented/  Respiratory Failure documented BNP and chest xray on admit c/w CHF especially with hx of anemia and pacemaker    HOsp PN 4/20   x Respiratory condition Acute hypoxemic resp failure  Covid 19 virus infection dcs  H&P   x RR      PaO2             PaCO2       O2 sat Attempts made to wean NRB by me, pt quickly becomes moderately tachypneic and lucidly says "I'm very short of breath".   -sats dropped to low 90s and I placed NRB back on to relieve her sxs.     Per ems  RA sats 85%  Ed md        H&P   x Treatment: O2 to keep O2 sat > 92%.   No treatment was started for COVID-19 since the patient is DNR/DNI  DCS  ED MD & DCS   x Supplemental O2: NRB Mask on arrival DCS   xx Oxygen dependence cannot get off of non re-breather 100 percent DCS   x Other: Covid test positive    found to be anemic ~6 & there no previous lab values to compare it to.    · Stool occult was negative per the ED physician.    · 1 unit PRBC was " ordered with 20 mg of IV lasix    made DNR/DNI in the ED DCS          DCS    DCS & ED MD     Provider, please specify diagnosis or diagnoses associated with above clinical findings.                      - possible 2 part response.    [    ] Acute pulmonary edema due to CHF   [   x] Acute pulmonary edmea due to Covid 19   [    ] Acute pulmonary edema, cardiac cause (please specify cardiac condition): ___________________   [    ] Acute pulmonary edema, non-cardiac cause (please specify causative condition): ___________________   [    ] Acute pulmonary edema, unspecified cause   [   ]  Acute and chronic pulmonary edema due to CHF   [    ] Acute and/on chronic pulmonary edema, cardiac cause (please specify cardiac condition): _______   [    ] Acute and/on chronic pulmonary edema, non-cardiac cause (please specify causative condition):  _______   [    ] Acute and/on chronic pulmonary edema, unspecified cause   [   ]  Chronic pulmonary edema due to CHF   [    ] Chronic pulmonary edema, cardiac cause (please specify cardiac condition): ___________________   [    ] Chronic pulmonary edema, non-cardiac cause (please specify causative condition): ___________________   [    ] Chronic pulmonary edema, unspecified cause   [    ] Other respiratory diagnosis (please specify): _________________________________    [   ]   Clinically Undetermined     Please document in your progress notes daily for the duration of treatment, until resolved, and include in your discharge summary.

## 2022-06-17 NOTE — PROGRESS NOTES
[facility] Hospital Medicine  Progress Note    Patient Name: Peri Aldridge  MRN: [id number]  Patient Class: IP- Inpatient   Admission Date: 4/16/2020  Length of Stay: 3 days  Attending Physician: Corey Mims MD  Primary Care Provider: Jamir Myers MD        Subjective:     Principal Problem:COVID-19 virus infection        HPI:  Pt is a pleasant 89 year old female with a h/o dementia, and resident of a nursing home who presents to the ER for dyspnea and hypoxia.  On arrival patient was placed on a non-rebreather.  In the ER her COVID test was positive for the coronovirus infection and her labwork pointed towards that as well. Her XRAY showed BL infiltrates vs pulmonary edema.  Of note patient was also found to be anemic ~6 and there no previous lab values to compare it to.  Stool occult was negative per the ED physician.  1 unit PRBC was ordered with 20 mg of IV lasix.  Palliative care was also consulted and after discussion with family patient was made DNR/DNI in the ED.  On interview pt is alert but disoriented and unable to contribute to history.  She askes ' can I go to my room now? '.          Overview/Hospital Course:  This patient was admitted by COVID-19 viral infection. She was started on supplemental O2 to keep O2 sat > 92%. No treatment was started for COVID-19 since the patient is DNR/DNI and we are awaiting the family to determine if she will be comfort care. Morphine was added on to her regimen to keep her comfortable.     While in the ER, she was transfused with 1U PRBCs for a Hgb 6.5. Her Hgb lux appropriately and remained stable. We continued her Klonopin BID after her family confirmed that she has been taking this for many years.    Interval History: Has been anxious overnight. Nursing staff reporting that she takes Klonopin BID at home and this was confirmed by her family. Afebrile. Her O2 sat dropped to 80% yesterday evening. No other overnight complaints.      Review of Systems   Constitutional: Negative for chills and fever.   HENT: Negative for nosebleeds.    Eyes: Negative for discharge.   Gastrointestinal: Negative for nausea and vomiting.   Neurological:        Dementia     Objective:     Vital Signs (Most Recent):  Temp: 97.7 °F (36.5 °C) (04/19/20 0703)  Pulse: 69 (04/19/20 0703)  Resp: 14 (04/19/20 0703)  BP: (!) 147/66 (04/19/20 0703)  SpO2: 97 % (04/19/20 1035) Vital Signs (24h Range):  Temp:  [97.4 °F (36.3 °C)-98.4 °F (36.9 °C)] 97.7 °F (36.5 °C)  Pulse:  [68-80] 69  Resp:  [14-30] 14  SpO2:  [80 %-100 %] 97 %  BP: (127-158)/(56-70) 147/66     Weight: 53.5 kg (117 lb 15.8 oz)  Body mass index is 21.58 kg/m².    Intake/Output Summary (Last 24 hours) at 4/19/2020 1254  Last data filed at 4/19/2020 0745  Gross per 24 hour   Intake 120 ml   Output --   Net 120 ml      Physical Exam   HENT:   Head: Normocephalic and atraumatic.   Eyes: Conjunctivae are normal.   Neck: Normal range of motion. Neck supple.   Cardiovascular: Normal rate, regular rhythm and normal heart sounds.   Pulmonary/Chest:   Coarse breath sounds bilaterally   Abdominal: Soft. Bowel sounds are normal.   Musculoskeletal: She exhibits no edema.   Neurological: She is alert.   Psychiatric: She has a normal mood and affect.       Significant Labs:   CBC:   Recent Labs   Lab 04/18/20  0815 04/19/20  0415   WBC 9.31 13.87*   HGB 8.8* 9.8*   HCT 30.7* 35.5*   * 509*     CMP:   Recent Labs   Lab 04/18/20  1906      K 4.5   *   CO2 21*   GLU 67*   BUN 34*   CREATININE 0.8   CALCIUM 7.8*   PROT 5.4*   ALBUMIN 2.3*   BILITOT 0.5   ALKPHOS 97   AST 27   ALT 15   ANIONGAP 10   EGFRNONAA >60     All pertinent labs within the past 24 hours have been reviewed.    Significant Imaging: I have reviewed and interpreted all pertinent imaging results/findings within the past 24 hours.      Assessment/Plan:      * COVID-19 virus infection  - COVID-19 positive  - Infection Control notified     -  Isolation:   - Airborne, Contact and Droplet Precautions  - Cohort patients into COVID units  - N95 masks must be fit tested, wear eye protection  - 20 second hand hygiene              - Limit visitors per hospital policy              - Consolidating lab draws, nursing care, provider visits, and interventions    - Diagnostics: (leukopenia, hyponatremia, hyperferritinemia, elevated troponin, elevated d-dimer, age, and comorbidities are significant predictors of poor clinical outcome)  CBC, CMP, Procalcitonin, Ferritin, CRP, BNP, Troponin and Portable CXR    - Management:  Supplemental O2 to maintain SpO2 >92%  Continuous/intermittent Pulse Ox  Albuterol treatment PRN  Avoiding BiPAP to prevent aerosolization (including home BiPAP)   Morphine prn ordered to keep her comffortable    Patient made DNR/DNI in the ER.  Palliative care evaluated patient and will continue family discussions to determine they desire hospice.  Patient is a resident of a nursing home but NH will not accept COVID-19 patients.    Acute anemia  Unclear etiology.    Stool occult negative per ED provider.    s/p 1U PRBCs with appropriate rise in Hgb  Trend H/H and transfuse prn.        Dementia  Chronic.  Monitor and determine baseline.  Pt unable to contribute to history.      Palliative care encounter  - Daughter would like to continue current treatment plan over the weekend unless a decline  - Cont Palliative care consult    VTE Risk Mitigation (From admission, onward)         Ordered     Place sequential compression device  Until discontinued      04/16/20 1837     IP VTE HIGH RISK PATIENT  Once      04/16/20 1834     Place BRITTNEY hose  Until discontinued      04/16/20 1834                      Corey Mims MD  Department of Hospital Medicine   [facility]     No